# Patient Record
Sex: FEMALE | Race: WHITE | Employment: OTHER | URBAN - METROPOLITAN AREA
[De-identification: names, ages, dates, MRNs, and addresses within clinical notes are randomized per-mention and may not be internally consistent; named-entity substitution may affect disease eponyms.]

---

## 2018-02-02 ENCOUNTER — APPOINTMENT (OUTPATIENT)
Dept: CT IMAGING | Age: 48
End: 2018-02-02
Attending: PHYSICIAN ASSISTANT
Payer: COMMERCIAL

## 2018-02-02 ENCOUNTER — HOSPITAL ENCOUNTER (EMERGENCY)
Age: 48
Discharge: HOME | End: 2018-02-02
Attending: EMERGENCY MEDICINE
Payer: COMMERCIAL

## 2018-02-02 ENCOUNTER — APPOINTMENT (OUTPATIENT)
Dept: GENERAL RADIOLOGY | Age: 48
End: 2018-02-02
Attending: PHYSICIAN ASSISTANT
Payer: COMMERCIAL

## 2018-02-02 VITALS
HEART RATE: 70 BPM | DIASTOLIC BLOOD PRESSURE: 66 MMHG | RESPIRATION RATE: 18 BRPM | OXYGEN SATURATION: 100 % | HEIGHT: 59 IN | SYSTOLIC BLOOD PRESSURE: 113 MMHG | BODY MASS INDEX: 26.81 KG/M2 | TEMPERATURE: 97.7 F | WEIGHT: 133 LBS
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
## 2018-02-02 DIAGNOSIS — S06.0X0D CONCUSSION WITHOUT LOSS OF CONSCIOUSNESS, SUBSEQUENT ENCOUNTER: ICD-10-CM

## 2018-02-02 DIAGNOSIS — S80.01XA CONTUSION OF RIGHT KNEE, INITIAL ENCOUNTER: ICD-10-CM

## 2018-02-02 DIAGNOSIS — S70.01XA CONTUSION OF RIGHT HIP, INITIAL ENCOUNTER: ICD-10-CM

## 2018-02-02 PROCEDURE — 70450 CT HEAD/BRAIN W/O DYE: CPT

## 2018-02-02 PROCEDURE — 73564 X-RAY EXAM KNEE 4 OR MORE: CPT

## 2018-02-02 PROCEDURE — 73502 X-RAY EXAM HIP UNI 2-3 VIEWS: CPT

## 2018-02-02 PROCEDURE — 99284 EMERGENCY DEPT VISIT MOD MDM: CPT

## 2018-02-02 RX ORDER — TRIAZOLAM 0.25 MG/1
0.12 TABLET ORAL
COMMUNITY

## 2018-02-02 RX ORDER — PANTOPRAZOLE SODIUM 40 MG/1
40 TABLET, DELAYED RELEASE ORAL 2 TIMES DAILY
COMMUNITY

## 2018-02-02 RX ORDER — AMITRIPTYLINE HYDROCHLORIDE 75 MG/1
50 TABLET, FILM COATED ORAL
COMMUNITY
End: 2018-07-26 | Stop reason: ALTCHOICE

## 2018-02-02 RX ORDER — PRAVASTATIN SODIUM 20 MG/1
20 TABLET ORAL
COMMUNITY

## 2018-02-02 RX ORDER — HYDROGEN PEROXIDE 3 %
20 SOLUTION, NON-ORAL MISCELLANEOUS DAILY
COMMUNITY
End: 2018-08-21

## 2018-02-02 RX ORDER — SUMATRIPTAN 100 MG/1
100 TABLET, FILM COATED ORAL
COMMUNITY

## 2018-02-02 RX ORDER — TOPIRAMATE 25 MG/1
100 TABLET ORAL 2 TIMES DAILY
COMMUNITY

## 2018-02-02 RX ORDER — PROPRANOLOL HYDROCHLORIDE 40 MG/1
40 TABLET ORAL 2 TIMES DAILY
COMMUNITY
End: 2018-08-21

## 2018-02-02 NOTE — ED PROVIDER NOTES
EMERGENCY DEPARTMENT HISTORY AND PHYSICAL EXAM    Date: 2/2/2018  Patient Name: Miky Jacques    History of Presenting Illness     Chief Complaint   Patient presents with   Danuta Rung Fall    Headache         History Provided By: Patient    Chief Complaint: head injury  Duration: 3 days ago  Associated Symptoms: diffuse headache, dizziness (increased with movement), nausea, vomiting, and difficulty concentrating    Additional History (Context):   6:21 PM    Miky Jacques is a 49 y.o. female with pertinent PMHx of hysterectomy, multiminicore neuropathy, migraines, fibromyalgia, and chronic pain presenting ambulatory to the ED c/o head injury 3 days ago. Pt states that she fell 3 days ago. She fell out of bed and hit her head on the floor, but did not remember it. Pt states that she crawled to the bathroom and when she tried to stand with the support of the toilet, she fell again. She attempted to stand again and fell another time, with a total of 3 falls. She believes that she experienced LOC and states \"I don't know how I got back to the bed\". Pt notes associated symptoms of diffuse headache, dizziness (increased with movement), nausea, vomiting, and difficulty concentrating. Pt notes a few areas of bruising from the fall, with significant pain to the right knee and right hip. Pt's  notes concern as Ashley Most never spells things wrong, but she has been spelling things wrong in texts over the last few days\". Pt was told to go to [facility] two days ago by her Neurologist. Pt states \"they didn't do a thing and we waited for 5 hours\". She also called her PCP today, who advised that she go to the ED today. Pt specifically denies any blood thinner use. Pt specifically denies any fever/chills. Social Hx: - tobacco use, - alcohol use, - illicit drug use    There are no other complaints, changes, or physical findings at this time.      Current Outpatient Prescriptions   Medication Sig Dispense Refill    amitriptyline (ELAVIL) 75 mg tablet Take 50 mg by mouth nightly. Indications: MIGRAINE PREVENTION      pantoprazole (PROTONIX) 40 mg tablet Take 40 mg by mouth daily. Indications: gastroesophageal reflux disease      pravastatin (PRAVACHOL) 20 mg tablet Take 20 mg by mouth nightly. Indications: hypercholesterolemia      propranolol (INDERAL) 40 mg tablet Take 40 mg by mouth two (2) times a day. Indications: MIGRAINE PREVENTION      topiramate (TOPAMAX) 25 mg tablet Take 75 mg by mouth two (2) times a day. Indications: MIGRAINE PREVENTION      esomeprazole (NEXIUM) 20 mg capsule Take 20 mg by mouth daily.  SUMAtriptan (IMITREX) 100 mg tablet Take 100 mg by mouth once as needed for Migraine.  triazolam (HALCION) 0.25 mg tablet Take 0.25 mg by mouth nightly as needed. Past History     Past Medical History:  Past Medical History:   Diagnosis Date    Arthritis     Gastrointestinal disorder     Ill-defined condition     firbomyalgia    Ill-defined condition     chronic pain    Neurological disorder     migraines       Past Surgical History:  Past Surgical History:   Procedure Laterality Date    HX APPENDECTOMY      HX CHOLECYSTECTOMY      HX GI      HX GYN      hysterectomy    HX ORTHOPAEDIC         Family History:  History reviewed. No pertinent family history. Social History:  Social History   Substance Use Topics    Smoking status: Never Smoker    Smokeless tobacco: Never Used    Alcohol use No       Allergies: Allergies   Allergen Reactions    Compazine [Prochlorperazine] Seizures    Animi-3 [Om [id number]] Swelling    Hydrocodone Itching and Nausea and Vomiting    Lorabid [Loracarbef] Hives and Itching    Sulfa (Sulfonamide Antibiotics) Nausea Only    Thorazine [Chlorpromazine] Palpitations         Review of Systems   Review of Systems   Constitutional: Negative for chills and fever. Gastrointestinal: Positive for nausea and vomiting.    Musculoskeletal: Positive for arthralgias (right knee pain and right hip pain). Neurological: Positive for dizziness and headaches. Positive for LOC  Positive for difficulty concentrating  Positive for head injury   All other systems reviewed and are negative. Physical Exam     Vitals:    02/02/18 1736   BP: 113/66   Pulse: 70   Resp: 18   Temp: 97.7 °F (36.5 °C)   SpO2: 100%   Weight: 60.3 kg (133 lb)   Height: 4' 11\" (1.499 m)     Physical Exam   Constitutional: She is oriented to person, place, and time. She appears well-developed and well-nourished. Alert,lying on stretcher, oriented x4    HENT:   Head: Normocephalic and atraumatic. Head is without raccoon's eyes and without Godinez's sign. Right Ear: Tympanic membrane, external ear and ear canal normal. No mastoid tenderness. Tympanic membrane is not perforated, not erythematous, not retracted and not bulging. No hemotympanum. Left Ear: Tympanic membrane, external ear and ear canal normal. No mastoid tenderness. Tympanic membrane is not perforated, not erythematous, not retracted and not bulging. No hemotympanum. Nose: Nose normal.   Mouth/Throat: Uvula is midline, oropharynx is clear and moist and mucous membranes are normal. No trismus in the jaw. No uvula swelling. No oropharyngeal exudate, posterior oropharyngeal edema, posterior oropharyngeal erythema or tonsillar abscesses. Eyes: EOM are normal. Pupils are equal, round, and reactive to light. Neck: Normal range of motion. Neck supple. No spinous process tenderness and no muscular tenderness present. No rigidity. Normal range of motion present. No Brudzinski's sign and no Kernig's sign noted. No meningismus   No lymphadenopathy    Cardiovascular: Normal rate, regular rhythm, normal heart sounds and intact distal pulses. No murmur heard. Pulmonary/Chest: Effort normal and breath sounds normal. No respiratory distress. She has no wheezes. She has no rales. Abdominal: Soft.  Bowel sounds are normal. She exhibits no distension. There is no tenderness. There is no rebound and no guarding. Musculoskeletal: Normal range of motion. Right knee: She exhibits ecchymosis. She exhibits normal range of motion, no swelling, no effusion, no deformity and normal patellar mobility. Tenderness found. No patellar tendon tenderness noted. Legs:  LLE chronically weaker than right, baseline for pt    Neurological: She is alert and oriented to person, place, and time. She has normal strength. She displays no atrophy and no tremor. No cranial nerve deficit or sensory deficit. She exhibits normal muscle tone. Coordination and gait normal. GCS eye subscore is 4. GCS verbal subscore is 5. GCS motor subscore is 6. No neuro deficit   Normal finger nose finger  N/V intact distally   Strength 5/5 and equal in all extremities   No slurred speech   No facial droop    Skin: Skin is warm and dry. Psychiatric: She has a normal mood and affect. Judgment normal.   Nursing note and vitals reviewed. Diagnostic Study Results     Labs -   No results found for this or any previous visit (from the past 12 hour(s)). Radiologic Studies -   XR HIP RT W OR WO PELV 2-3 VWS   Final Result      IMPRESSION  IMPRESSION: No definite fracture. However, the frontal view demonstrates  questionable subtle impaction of the femoral neck. Given the degree of  osteopenia, if the patient is having ongoing pain with persistent clinical  suspicion for occult fracture, recommend follow-up CT. CT HEAD WO CONT   Final Result      XR KNEE RT MIN 4 V    (Results Pending)     CT Results  (Last 48 hours)               02/02/18 1935  CT HEAD WO CONT Final result    Impression:  IMPRESSION:       1. No acute intracranial hemorrhage, mass effect, midline shift, or herniation. No definite CT evidence of acute cortical infarct is seen. Please note that   noncontrast head CT may be normal in early acute infarct.            Narrative:  EXAM: CT head INDICATION: Closed head trauma. COMPARISON: None. TECHNIQUE: Axial CT imaging of the head was performed without intravenous   contrast. Multiplanar reconstructions were performed. One or more dose   reduction techniques were used on this CT: automated exposure control,   adjustment of the mAs and/or kVp according to patient's size, and iterative   reconstruction techniques. The specific techniques utilized on this CT exam have   been documented in the patient's electronic medical record.       _______________       FINDINGS:       VENTRICLES/EXTRA-AXIAL SPACES: The ventricles and sulci are normal in their size   and configuration. BRAIN PARENCHYMA: There is no evidence of acute intracranial hemorrhage, mass   effect, midline shift, or herniation. No definite CT evidence of acute cortical   infarct is seen. The gray-white matter differentiation is within normal limits. OSSEOUS STRUCTURES: No fracture is seen. PARANASAL SINUSES/MASTOIDS: Visualized paranasal sinuses and mastoid air cells   are clear. ORBITS: The visualized orbits are unremarkable. OTHER: None.         _______________             9:09 PM  RADIOLOGY FINDINGS  Per Radiologist reading the right hip XR, states that if the pt is able to weight bear, then it is not an acute fx. Written by Raj Fong [address] [place] [address], ED Scribe, as dictated by Phi Cheng PA-C.     9:16 PM  RADIOLOGY FINDINGS  Right knee X-ray shows no acute process. Pending review by Radiologist  Recorded by Kd Jacobo, ED Scribe, as dictated by Phi Cheng PA-C. Medical Decision Making   I am the first provider for this patient. I reviewed the vital signs, available nursing notes, past medical history, past surgical history, family history and social history. Vital Signs-Reviewed the patient's vital signs.     Pulse Oximetry Analysis - 100% on RA     Records Reviewed: Nursing Notes    Provider Notes (Medical Decision Making):   Skull fx, intracranial bleed, concussion, hip fx, knee fx vs contusion     PROCEDURES:  Procedures    MEDICATIONS GIVEN IN THE ED:  Medications - No data to display     ED COURSE:   6:21 PM   Initial assessment performed. PROGRESS NOTE:  9:17 PM  Pt and/or family have been updated on their results. Pt and/or pt's family are aware of the plan of care and are in agreement. Written by Michael Bradley, ED Scribe, as dictated by Omid Vuong PA-C. Discussion  Recent fall with head injury, no neuro deficits, chronic/unchanged left leg weakness. Normal head CT. Pt was able to weight bear. She states that she has had a right hip fx in the past, and states that her current pain is not the same. Do not suspect findings seen on XR represent acute hip fracture. Diagnosis and Disposition       DISCHARGE NOTE:  9:18 PM  The patient is ready for discharge. The patient's signs, symptoms, diagnosis, and discharge instructions have been discussed and the patient and/or family has conveyed their understanding. The patient and/or family is to follow up as recommended or return to the ER should their symptoms worsen. Plan has been discussed and the patient and/or family is in agreement. Written by Michael Bradley, ED Scribe, as dictated by Omid Vuong PA-C.     CLINICAL IMPRESSION:  1. Fall, initial encounter    2. Concussion without loss of consciousness, subsequent encounter    3. Contusion of right hip, initial encounter    4. Contusion of right knee, initial encounter          PLAN:  1. D/C Home  2. Discharge Medication List as of 2/2/2018  9:18 PM        3.    Follow-up Information     Follow up With Details Comments [id number] [facility] [address] Jeff Sharma MD Schedule an appointment as soon as possible for a visit for primary care follow up, as needed [id number] [facility] [address] EMERGENCY DEPT  As needed, If symptoms worsen [address] [id number] [phone] _______________________________    Attestations: This note is prepared by Carmina Conn, acting as Scribe for Beth Tapia PA-C. Beth Tapia PA-C:  The scribe's documentation has been prepared under my direction and personally reviewed by me in its entirety.   I confirm that the note above accurately reflects all work, treatment, procedures, and medical decision making performed by me.  _______________________________

## 2018-02-02 NOTE — ED TRIAGE NOTES
Patient with complaints of fall that occurred on Tuesday. Patient states she hit her head and she has a headache and feels tired. Patient was seen at [facility] on Wednesday and discharged home. Patient states she followed up with her neurologist and she still doesn't feel well.

## 2018-02-03 NOTE — ED NOTES
Pt lying on left side conversing w/  at the bedside. Pt in NAD. Pt updated on status of imaging studies being read. Warm blankets provided.

## 2018-02-03 NOTE — DISCHARGE INSTRUCTIONS
Bruises: Care Instructions  Your Care Instructions    Bruises occur when small blood vessels under the skin tear or rupture, most often from a twist, bump, or fall. Blood leaks into tissues under the skin and causes a black-and-blue spot that often turns colors, including purplish black, reddish blue, or yellowish green, as the bruise heals. Bruises hurt, but most are not serious and will go away on their own within 2 to 4 weeks. Sometimes, gravity causes them to spread down the body. A leg bruise usually will take longer to heal than a bruise on the face or arms. Follow-up care is a key part of your treatment and safety. Be sure to make and go to all appointments, and call your doctor if you are having problems. It's also a good idea to know your test results and keep a list of the medicines you take. How can you care for yourself at home? · Take pain medicines exactly as directed. ¨ If the doctor gave you a prescription medicine for pain, take it as prescribed. ¨ If you are not taking a prescription pain medicine, ask your doctor if you can take an over-the-counter medicine. · Put ice or a cold pack on the area for 10 to 20 minutes at a time. Put a thin cloth between the ice and your skin. · If you can, prop up the bruised area on pillows as much as possible for the next few days. Try to keep the bruise above the level of your heart. When should you call for help? Call your doctor now or seek immediate medical care if:  ? · You have signs of infection, such as:  ¨ Increased pain, swelling, warmth, or redness. ¨ Red streaks leading from the bruise. ¨ Pus draining from the bruise. ¨ A fever. ? · You have a bruise on your leg and signs of a blood clot, such as:  ¨ Increasing redness and swelling along with warmth, tenderness, and pain in the bruised area. ¨ Pain in your calf, back of the knee, thigh, or groin. ¨ Redness and swelling in your leg or groin. ? · Your pain gets worse. ? Watch closely for changes in your health, and be sure to contact your doctor if:  ? · You do not get better as expected. Where can you learn more? Go to http://asiya-bre.info/. Enter [phone] in the search box to learn more about \"Bruises: Care Instructions. \"  Current as of: March 20, 2017  Content Version: 11.4  © 2075-3699 [employer]. Care instructions adapted under license by [employer] (which disclaims liability or warranty for this information). If you have questions about a medical condition or this instruction, always ask your healthcare professional. [place] [address] any warranty or liability for your use of this information. Concussion: Care Instructions  Your Care Instructions    A concussion is a kind of injury to the brain. It happens when the head receives a hard blow. The impact can jar or shake the brain against the skull. This interrupts the brain's normal activities. Although you may have cuts or bruises on your head or face, you may have no other visible signs of a brain injury. In most cases, damage to the brain from a concussion can't be seen in tests such as a CT or MRI scan. For a few weeks, you may have low energy, dizziness, trouble sleeping, a headache, ringing in your ears, or nausea. You may also feel anxious, grumpy, or depressed. You may have problems with memory and concentration. These symptoms are common after a concussion. They should slowly improve over time. Sometimes this takes weeks or even months. Someone who lives with you should know how to care for you. Please share this and all information with a caregiver who will be available to help if needed. Follow-up care is a key part of your treatment and safety. Be sure to make and go to all appointments, and call your doctor if you are having problems. It's also a good idea to know your test results and keep a list of the medicines you take.   How can you care for yourself at home? Pain control  · Put ice or a cold pack on the part of your head that hurts for 10 to 20 minutes at a time. Put a thin cloth between the ice and your skin. · Be safe with medicines. Read and follow all instructions on the label. ¨ If the doctor gave you a prescription medicine for pain, take it as prescribed. ¨ If you are not taking a prescription pain medicine, ask your doctor if you can take an over-the-counter medicine. Recovery  · Follow your doctor's instructions. He or she will tell you if you need someone to watch you closely for the next 24 hours or longer. · Rest is the best way to recover from a concussion. You need to rest your body and your brain:  ¨ Get plenty of sleep at night. And take rest breaks during the day. ¨ Avoid activities that take a lot of physical or mental work. This includes housework, exercise, schoolwork, video games, text messaging, and using the computer. ¨ You may need to change your school or work schedule while you recover. ¨ Return to your normal activities slowly. Do not try to do too much at once. · Do not drink alcohol or use illegal drugs. Alcohol and illegal drugs can slow your recovery. And they can increase your risk of a second brain injury. · Avoid activities that could lead to another concussion. Follow your doctor's instructions for a gradual return to activity and sports. · Ask your doctor when it's okay for you to drive a car, ride a bike, or operate machinery. How should you return to activity? Your return to sports or activity should be gradual. It should only begin when all symptoms of a concussion are gone, both while at rest and during exercise or exertion. Doctors and concussion specialists suggest steps to follow for returning to sports after a concussion. Use these steps as a guide. You should slowly progress through the following levels of activity:  1. No activity.  This means complete physical and mental rest.  2. Light aerobic activity. This can include walking, swimming, or other exercise at less than 70% of maximum heart rate. No resistance training is included in this step. 3. Sport-specific exercise. This includes running drills or skating drills (depending on the sport), but no head impact. 4. Noncontact training drills. This includes more complex training drills such as passing. The athlete may also begin light resistance training. 5. Full-contact practice. The athlete can participate in normal training. 6. Return to normal game play. This is the final step and allows the athlete to join in normal game play. Watch and keep track of your progress. It should take at least 6 days for you to go from light activity to normal game play. Make sure that you can stay at each new level of activity for at least 24 hours without symptoms, or as long as your doctor says, before doing more. If one or more symptoms come back, return to a lower level of activity for at least 24 hours. Don't move on until all symptoms are gone. When should you call for help? Call 911 anytime you think you may need emergency care. For example, call if:  ? · You have a seizure. ? · You passed out (lost consciousness). ? · You are confused or can't stay awake. ?Call your doctor now or seek immediate medical care if:  ? · You have new or worse vomiting. ? · You feel less alert. ? · You have new weakness or numbness in any part of your body. ? Watch closely for changes in your health, and be sure to contact your doctor if:  ? · You do not get better as expected. ? · You have new symptoms, such as headaches, trouble concentrating, or changes in mood. Where can you learn more? Go to http://asiya-bre.info/. Enter X704 in the search box to learn more about \"Concussion: Care Instructions. \"  Current as of: October 14, 2016  Content Version: 11.4  © 3233-3592 [employer].  Care instructions adapted under license by [address] (which disclaims liability or warranty for this information). If you have questions about a medical condition or this instruction, always ask your healthcare professional. [address] any warranty or liability for your use of this information. Preventing Falls: Care Instructions  Your Care Instructions    Getting around your home safely can be a challenge if you have injuries or health problems that make it easy for you to fall. Loose rugs and furniture in walkways are among the dangers for many older people who have problems walking or who have poor eyesight. People who have conditions such as arthritis, osteoporosis, or dementia also have to be careful not to fall. You can make your home safer with a few simple measures. Follow-up care is a key part of your treatment and safety. Be sure to make and go to all appointments, and call your doctor if you are having problems. It's also a good idea to know your test results and keep a list of the medicines you take. How can you care for yourself at home? Taking care of yourself  · You may get dizzy if you do not drink enough water. To prevent dehydration, drink plenty of fluids, enough so that your urine is light yellow or clear like water. Choose water and other caffeine-free clear liquids. If you have kidney, heart, or liver disease and have to limit fluids, talk with your doctor before you increase the amount of fluids you drink. · Exercise regularly to improve your strength, muscle tone, and balance. Walk if you can. Swimming may be a good choice if you cannot walk easily. · Have your vision and hearing checked each year or any time you notice a change. If you have trouble seeing and hearing, you might not be able to avoid objects and could lose your balance. · Know the side effects of the medicines you take. Ask your doctor or pharmacist whether the medicines you take can affect your balance. Sleeping pills or sedatives can affect your balance. · Limit the amount of alcohol you drink. Alcohol can impair your balance and other senses. · Ask your doctor whether calluses or corns on your feet need to be removed. If you wear loose-fitting shoes because of calluses or corns, you can lose your balance and fall. · Talk to your doctor if you have numbness in your feet. Preventing falls at home  · Remove raised doorway thresholds, throw rugs, and clutter. Repair loose carpet or raised areas in the floor. · Move furniture and electrical cords to keep them out of walking paths. · Use nonskid floor wax, and wipe up spills right away, especially on ceramic tile floors. · If you use a walker or cane, put rubber tips on it. If you use crutches, clean the bottoms of them regularly with an abrasive pad, such as steel wool. · Keep your house well lit, especially [facility], and outside walkways. Use night-lights in areas such as hallways and bathrooms. Add extra light switches or use remote switches (such as switches that go on or off when you clap your hands) to make it easier to turn lights on if you have to get up during the night. · Install sturdy handrails on stairways. · Move items in your cabinets so that the things you use a lot are on the lower shelves (about waist level). · Keep a cordless phone and a flashlight with new batteries by your bed. If possible, put a phone in each of the main rooms of your house, or carry a cell phone in case you fall and cannot reach a phone. Or, you can wear a device around your neck or wrist. You push a button that sends a signal for help. · Wear low-heeled shoes that fit well and give your feet good support. Use footwear with nonskid soles. Check the heels and soles of your shoes for wear. Repair or replace worn heels or soles. · Do not wear socks without shoes on wood floors. · Walk on the grass when the sidewalks are slippery.  If you live in an area that gets snow and ice in the winter, sprinkle salt on slippery steps and sidewalks. Preventing falls in the bath  · Install grab bars and nonskid mats inside and outside your shower or tub and near the toilet and sinks. · Use shower chairs and bath benches. · Use a hand-held shower head that will allow you to sit while showering. · Get into a tub or shower by putting the weaker leg in first. Get out of a tub or shower with your strong side first.  · Repair loose toilet seats and consider installing a raised toilet seat to make getting on and off the toilet easier. · Keep your bathroom door unlocked while you are in the shower. Where can you learn more? Go to http://asiya-bre.info/. Enter [phone] [id number] [phone] in the search box to learn more about \"Preventing Falls: Care Instructions. \"  Current as of: May 12, 2017  Content Version: 11.4  © 8100-4535 [employer], [facility]. Care instructions adapted under license by [employer] (which disclaims liability or warranty for this information). If you have questions about a medical condition or this instruction, always ask your healthcare professional. [place] [address] any warranty or liability for your use of this information.

## 2018-02-05 ENCOUNTER — HOSPITAL ENCOUNTER (EMERGENCY)
Age: 48
Discharge: HOME | End: 2018-02-05
Attending: INTERNAL MEDICINE | Admitting: EMERGENCY MEDICINE
Payer: COMMERCIAL

## 2018-02-05 ENCOUNTER — APPOINTMENT (OUTPATIENT)
Dept: GENERAL RADIOLOGY | Age: 48
End: 2018-02-05
Attending: PHYSICIAN ASSISTANT
Payer: COMMERCIAL

## 2018-02-05 ENCOUNTER — APPOINTMENT (OUTPATIENT)
Dept: CT IMAGING | Age: 48
End: 2018-02-05
Attending: PHYSICIAN ASSISTANT
Payer: COMMERCIAL

## 2018-02-05 VITALS
TEMPERATURE: 98.1 F | RESPIRATION RATE: 15 BRPM | OXYGEN SATURATION: 100 % | HEIGHT: 59 IN | WEIGHT: 133 LBS | HEART RATE: 69 BPM | BODY MASS INDEX: 26.81 KG/M2 | DIASTOLIC BLOOD PRESSURE: 76 MMHG | SYSTOLIC BLOOD PRESSURE: 121 MMHG

## 2018-02-05 DIAGNOSIS — M25.552 PAIN OF BOTH HIP JOINTS: ICD-10-CM

## 2018-02-05 DIAGNOSIS — R07.89 MUSCULOSKELETAL CHEST PAIN: Primary | ICD-10-CM

## 2018-02-05 DIAGNOSIS — M25.551 PAIN OF BOTH HIP JOINTS: ICD-10-CM

## 2018-02-05 DIAGNOSIS — M54.6 ACUTE LEFT-SIDED THORACIC BACK PAIN: ICD-10-CM

## 2018-02-05 LAB
ALBUMIN SERPL-MCNC: 3.7 G/DL (ref 3.4–5)
ALBUMIN/GLOB SERPL: 1 {RATIO} (ref 0.8–1.7)
ALP SERPL-CCNC: 87 U/L (ref 45–117)
ALT SERPL-CCNC: 21 U/L (ref 13–56)
ANION GAP SERPL CALC-SCNC: 12 MMOL/L (ref 3–18)
AST SERPL-CCNC: 15 U/L (ref 15–37)
BASOPHILS # BLD: 0 K/UL (ref 0–0.06)
BASOPHILS NFR BLD: 0 % (ref 0–2)
BILIRUB SERPL-MCNC: 0.2 MG/DL (ref 0.2–1)
BUN SERPL-MCNC: 15 MG/DL (ref 7–18)
BUN/CREAT SERPL: 20 (ref 12–20)
CALCIUM SERPL-MCNC: 8.9 MG/DL (ref 8.5–10.1)
CHLORIDE SERPL-SCNC: 104 MMOL/L (ref 100–108)
CK MB CFR SERPL CALC: 1.4 % (ref 0–4)
CK MB SERPL-MCNC: 1.9 NG/ML (ref 5–25)
CK SERPL-CCNC: 136 U/L (ref 26–192)
CO2 SERPL-SCNC: 23 MMOL/L (ref 21–32)
CREAT SERPL-MCNC: 0.75 MG/DL (ref 0.6–1.3)
DIFFERENTIAL METHOD BLD: ABNORMAL
EOSINOPHIL # BLD: 0.1 K/UL (ref 0–0.4)
EOSINOPHIL NFR BLD: 2 % (ref 0–5)
ERYTHROCYTE [DISTWIDTH] IN BLOOD BY AUTOMATED COUNT: 14 % (ref 11.6–14.5)
GLOBULIN SER CALC-MCNC: 3.8 G/DL (ref 2–4)
GLUCOSE SERPL-MCNC: 91 MG/DL (ref 74–99)
HCT VFR BLD AUTO: 46.2 % (ref 35–45)
HGB BLD-MCNC: 14.6 G/DL (ref 12–16)
LYMPHOCYTES # BLD: 1.5 K/UL (ref 0.9–3.6)
LYMPHOCYTES NFR BLD: 29 % (ref 21–52)
MCH RBC QN AUTO: 35.1 PG (ref 24–34)
MCHC RBC AUTO-ENTMCNC: 31.6 G/DL (ref 31–37)
MCV RBC AUTO: 111.1 FL (ref 74–97)
MONOCYTES # BLD: 0.5 K/UL (ref 0.05–1.2)
MONOCYTES NFR BLD: 9 % (ref 3–10)
NEUTS SEG # BLD: 3.1 K/UL (ref 1.8–8)
NEUTS SEG NFR BLD: 60 % (ref 40–73)
PLATELET # BLD AUTO: 196 K/UL (ref 135–420)
PMV BLD AUTO: 8.6 FL (ref 9.2–11.8)
POTASSIUM SERPL-SCNC: 4.5 MMOL/L (ref 3.5–5.5)
PROT SERPL-MCNC: 7.5 G/DL (ref 6.4–8.2)
RBC # BLD AUTO: 4.16 M/UL (ref 4.2–5.3)
SODIUM SERPL-SCNC: 139 MMOL/L (ref 136–145)
TROPONIN I SERPL-MCNC: <0.02 NG/ML (ref 0–0.06)
WBC # BLD AUTO: 5.2 K/UL (ref 4.6–13.2)

## 2018-02-05 PROCEDURE — 99285 EMERGENCY DEPT VISIT HI MDM: CPT

## 2018-02-05 PROCEDURE — 85025 COMPLETE CBC W/AUTO DIFF WBC: CPT | Performed by: INTERNAL MEDICINE

## 2018-02-05 PROCEDURE — 82550 ASSAY OF CK (CPK): CPT | Performed by: INTERNAL MEDICINE

## 2018-02-05 PROCEDURE — 94762 N-INVAS EAR/PLS OXIMTRY CONT: CPT

## 2018-02-05 PROCEDURE — 74011250637 HC RX REV CODE- 250/637: Performed by: PHYSICIAN ASSISTANT

## 2018-02-05 PROCEDURE — 71046 X-RAY EXAM CHEST 2 VIEWS: CPT

## 2018-02-05 PROCEDURE — 73700 CT LOWER EXTREMITY W/O DYE: CPT

## 2018-02-05 PROCEDURE — 72070 X-RAY EXAM THORAC SPINE 2VWS: CPT

## 2018-02-05 PROCEDURE — 80053 COMPREHEN METABOLIC PANEL: CPT | Performed by: INTERNAL MEDICINE

## 2018-02-05 PROCEDURE — 93005 ELECTROCARDIOGRAM TRACING: CPT

## 2018-02-05 RX ORDER — ACETAMINOPHEN AND CODEINE PHOSPHATE 300; 30 MG/1; MG/1
1 TABLET ORAL
Qty: 6 TAB | Refills: 0 | Status: SHIPPED | OUTPATIENT
Start: 2018-02-05 | End: 2018-08-21

## 2018-02-05 RX ORDER — KETOROLAC TROMETHAMINE 30 MG/ML
30 INJECTION, SOLUTION INTRAMUSCULAR; INTRAVENOUS
Status: DISCONTINUED | OUTPATIENT
Start: 2018-02-05 | End: 2018-02-05 | Stop reason: HOSPADM

## 2018-02-05 RX ORDER — ACETAMINOPHEN AND CODEINE PHOSPHATE 300; 30 MG/1; MG/1
1 TABLET ORAL
Status: COMPLETED | OUTPATIENT
Start: 2018-02-05 | End: 2018-02-05

## 2018-02-05 RX ADMIN — ACETAMINOPHEN AND CODEINE PHOSPHATE 1 TABLET: 300; 30 TABLET ORAL at 17:48

## 2018-02-05 NOTE — ED PROVIDER NOTES
[address]  EMERGENCY DEPARTMENT HISTORY AND PHYSICAL EXAM    Date: 2/5/2018  Patient Name: Satish Menendez  YOB: 1970  Medical Record Number: [id number]    History of Presenting Illness     Chief Complaint   Patient presents with    Chest Pain       History Provided By: Patient    Chief Complaint: Chest pain  Duration: 1 Days  Timing:  Constant  Location: Substernal left side chest radiating up to midsternum  Quality: Sharp  Severity: 10 out of 10  Modifying Factors: Worse with deep breaths  Associated Symptoms: Thoracic midline back pain & new associated sxs of SOB onset yesterday, left sided back pain onset 2 days ago, left hip pain onset this morning, and tingling (\"pin prick sensation\") from waist down BLE    Additional History (Context):   2:05 PM   Satish Menendez is a 49 y.o. female with PMHX arthritis, who presents to the emergency department C/O constant substernal left sided chest pain radiating up to midsternum onset yesterday s/p fall 6 days ago. Associated symptoms include thoracic midline back pain. Pt was seen by [facility] s/p fall initially 6 days ago, then seen again by [facility] 3 days ago for same. Previous workup from [facility] includes right hip XR, CT head, and right knee; CT head and right knee XR were negative, but states she had abnormal right hip XR. Notes new associated sxs of SOB onset yesterday, left sided back pain onset 2 days ago, left hip pain onset this morning s/p BM, and tingling (\"pin prick sensation\") from waist and down BLE. Reports she feels SOB only because when she takes a deep breath, she feels pain in her chest. States she was unable to schedule appointment with her PCP, so pt came to ED. No hx of stress test. Pt denies cough, fever, chills, fx DM, hx HTN, and any other Sx or complaints.       Shx: -tobacco use, -EtOH use, -illicit drug use    PCP: Roddy Alfonso MD    Current Facility-Administered Medications   Medication Dose Route Frequency Provider Last Rate Last Dose    ketorolac (TORADOL) injection 30 mg  30 mg IntraVENous NOW [place], [place]        acetaminophen-codeine (TYLENOL #3) per tablet 1 Tab  1 Tab Oral NOW [place]         Current Outpatient Prescriptions   Medication Sig Dispense Refill    acetaminophen-codeine (TYLENOL-CODEINE #3) 300-30 mg per tablet Take 1 Tab by mouth every six (6) hours as needed for Pain. Max Daily Amount: 4 Tabs. 6 Tab 0    amitriptyline (ELAVIL) 75 mg tablet Take 50 mg by mouth nightly. Indications: MIGRAINE PREVENTION      pantoprazole (PROTONIX) 40 mg tablet Take 40 mg by mouth daily. Indications: gastroesophageal reflux disease      pravastatin (PRAVACHOL) 20 mg tablet Take 20 mg by mouth nightly. Indications: hypercholesterolemia      propranolol (INDERAL) 40 mg tablet Take 40 mg by mouth two (2) times a day. Indications: MIGRAINE PREVENTION      topiramate (TOPAMAX) 25 mg tablet Take 75 mg by mouth two (2) times a day. Indications: MIGRAINE PREVENTION      esomeprazole (NEXIUM) 20 mg capsule Take 20 mg by mouth daily.  SUMAtriptan (IMITREX) 100 mg tablet Take 100 mg by mouth once as needed for Migraine.  triazolam (HALCION) 0.25 mg tablet Take 0.25 mg by mouth nightly as needed. Past History     Past Medical History:  Past Medical History:   Diagnosis Date    Arthritis     At risk for falls     Concussion     Contusion     Fall     Gastrointestinal disorder     Ill-defined condition     firbomyalgia    Ill-defined condition     chronic pain    Neurological disorder     migraines       Past Surgical History:  Past Surgical History:   Procedure Laterality Date    HX APPENDECTOMY      HX CHOLECYSTECTOMY      HX GI      HX GYN      hysterectomy    HX ORTHOPAEDIC         Family History:  No family history on file.     Social History:  Social History   Substance Use Topics    Smoking status: Never Smoker    Smokeless tobacco: Never Used    Alcohol use No       Allergies: Allergies   Allergen Reactions    Compazine [Prochlorperazine] Seizures    Animi-3 [Om [id number]] Swelling    Hydrocodone Itching and Nausea and Vomiting    Lorabid [Loracarbef] Hives and Itching    Sulfa (Sulfonamide Antibiotics) Nausea Only    Thorazine [Chlorpromazine] Palpitations         Review of Systems     Review of Systems   Constitutional: Negative for chills and fever. Respiratory: Positive for shortness of breath. Negative for cough. Cardiovascular: Positive for chest pain. Musculoskeletal: Positive for back pain. Neurological:        (+) tingling   All other systems reviewed and are negative. Physical Exam     Vitals:    02/05/18 1340 02/05/18 1400 02/05/18 1545   BP: 121/70 101/70 121/76   Pulse: 74 71 69   Resp: 16 18 15   Temp: 98.1 °F (36.7 °C)     SpO2: 100% 99% 100%   Weight: 60.3 kg (133 lb)     Height: 4' 11\" (1.499 m)       Physical Exam   Constitutional: She is oriented to person, place, and time. She appears well-developed and well-nourished. No distress. Neck: Neck supple. No tracheal deviation present. Cardiovascular: Normal rate, regular rhythm and normal heart sounds. Exam reveals no gallop and no friction rub. No murmur heard. Pulmonary/Chest: Effort normal and breath sounds normal. No respiratory distress. She has no wheezes. She has no rales. She exhibits tenderness. + TTP over the anterior chest wall and under the left breast, no crepitus, subcutaneous emphysema, ecchymosis or step off. Musculoskeletal:   Non tender to midline palpation of the cervical, thoracic, and lumbar spine. No step off or deformity. Mild TTP over the left side of the thoracic spine. FROM of BUE and BLE against resistance in flexion and extension with 5/5 strength. Non tender to bilateral shoulders/elbows/hands/hips/knees/ankles.   Pulses intact and equal.  Patient reports tingling in the left hip, sensation appear grossly intact on exam.      Lymphadenopathy:     She has no cervical adenopathy. Neurological: She is alert and oriented to person, place, and time. No cranial nerve deficit. Skin: Skin is warm and dry. No rash noted. She is not diaphoretic. No erythema. No pallor. Psychiatric: She has a normal mood and affect. Her behavior is normal.   Nursing note and vitals reviewed. Diagnostic Study Results     Labs -     Recent Results (from the past 12 hour(s))   EKG, 12 LEAD, INITIAL    Collection Time: 02/05/18  1:44 PM   Result Value Ref Range    Ventricular Rate 75 BPM    Atrial Rate 75 BPM    P-R Interval 190 ms    QRS Duration 82 ms    Q-T Interval 352 ms    QTC Calculation (Bezet) 393 ms    Calculated P Axis 62 degrees    Calculated R Axis 34 degrees    Calculated T Axis 49 degrees    Diagnosis       Normal sinus rhythm  Possible Left atrial enlargement  ST & T wave abnormality, consider anterior ischemia  Abnormal ECG  No previous ECGs available     CBC WITH AUTOMATED DIFF    Collection Time: 02/05/18  1:50 PM   Result Value Ref Range    WBC 5.2 4.6 - 13.2 K/uL    RBC 4.16 (L) 4.20 - 5.30 M/uL    HGB 14.6 12.0 - 16.0 g/dL    HCT 46.2 (H) 35.0 - 45.0 %    .1 (H) 74.0 - 97.0 FL    MCH 35.1 (H) 24.0 - 34.0 PG    MCHC 31.6 31.0 - 37.0 g/dL    RDW 14.0 11.6 - 14.5 %    PLATELET 694 679 - 546 K/uL    MPV 8.6 (L) 9.2 - 11.8 FL    NEUTROPHILS 60 40 - 73 %    LYMPHOCYTES 29 21 - 52 %    MONOCYTES 9 3 - 10 %    EOSINOPHILS 2 0 - 5 %    BASOPHILS 0 0 - 2 %    ABS. NEUTROPHILS 3.1 1.8 - 8.0 K/UL    ABS. LYMPHOCYTES 1.5 0.9 - 3.6 K/UL    ABS. MONOCYTES 0.5 0.05 - 1.2 K/UL    ABS. EOSINOPHILS 0.1 0.0 - 0.4 K/UL    ABS.  BASOPHILS 0.0 0.0 - 0.06 K/UL    DF AUTOMATED     METABOLIC PANEL, COMPREHENSIVE    Collection Time: 02/05/18  1:50 PM   Result Value Ref Range    Sodium 139 136 - 145 mmol/L    Potassium 4.5 3.5 - 5.5 mmol/L    Chloride 104 100 - 108 mmol/L    CO2 23 21 - 32 mmol/L    Anion gap 12 3.0 - 18 mmol/L Glucose 91 74 - 99 mg/dL    BUN 15 7.0 - 18 MG/DL    Creatinine 0.75 0.6 - 1.3 MG/DL    BUN/Creatinine ratio 20 12 - 20      GFR est AA >60 >60 ml/min/1.73m2    GFR est non-AA >60 >60 ml/min/1.73m2    Calcium 8.9 8.5 - 10.1 MG/DL    Bilirubin, total 0.2 0.2 - 1.0 MG/DL    ALT (SGPT) 21 13 - 56 U/L    AST (SGOT) 15 15 - 37 U/L    Alk. phosphatase 87 45 - 117 U/L    Protein, total 7.5 6.4 - 8.2 g/dL    Albumin 3.7 3.4 - 5.0 g/dL    Globulin 3.8 2.0 - 4.0 g/dL    A-G Ratio 1.0 0.8 - 1.7     CARDIAC PANEL,(CK, CKMB & TROPONIN)    Collection Time: 02/05/18  1:50 PM   Result Value Ref Range     26 - 192 U/L    CK - MB 1.9 <3.6 ng/ml    CK-MB Index 1.4 0.0 - 4.0 %    Troponin-I, Qt. <0.02 0.00 - 0.06 NG/ML       Radiologic Studies -   XR SPINE THORAC 2 V   Final Result  IMPRESSION:  1. Levorotatory curvature of the midthoracic spine with associated accentuation  of usual thoracic kyphosis. Associated degenerative changes present without  acute radiographic abnormality. As read by the radiologist.      CT Results  (Last 48 hours)               02/05/18 1620  CT HIP RT WO CONT Final result    Impression:  IMPRESSION:   1. No CT evidence of right hip fracture. No evidence of hip joint effusion. Correlation with patient weightbearing status is recommended. If there is   limited weight-bearing, by the patient, and MR examination of the right hip is   recommended to assess for CT occult trabecular microfracture. 2. Left total hip arthroplasty without evidence of acute complicating feature. Narrative:  Examination: CT right hip without contrast.       HISTORY: Right hip pain, possible nondisplaced proximal right hip fracture. TECHNIQUE: CT imaging of the right hip was performed in the axial plane   utilizing both bone and soft tissue reconstruction algorithms. Additional   coronal and sagittal reformatted images were performed by the technologist and   are included in interpretation.        One or more dose reduction techniques were used on this CT: automated exposure   control, adjustment of the mAs and/or kVp according to patient's size, and   iterative reconstruction techniques. The specific techniques utilized on this CT   exam have been documented in the patient's electronic medical record. .       COMPARISON: Right hip radiographs 2/2/2018. FINDINGS:       Included lower lumbar spine demonstrates vertebral body heights within normal   limits. Intervertebral disc spaces are preserved. The sacroiliac joint and   included sacrum are within normal limits. There is partial visualization of a   left hip arthroplasty with lateral claw plate and cerclage wire construct,   without evidence of periprosthetic fracture formation. Heterotopic ossification   at the iliopsoas tendon insertion noted. With specific attention to the right hip, the osseous alignment is within normal   limits. There is no evidence of displaced right hip fracture. Similarly, no   discrete cortical break or abnormal periosteal reaction noted. No joint   effusion. Trabecular architecture is maintained. Underlying osteopenia is   present. The supra-acetabular iliac bone, anterior and posterior acetabular   walls, as well as the obturator ring are intact. Muscle bulk about the proximal right thigh is within normal limits. Prior ventral abdominal hernia repair noted with multiple suture anchors noted. No free pelvic fluid or adenopathy. CXR Results  (Last 48 hours)               02/05/18 1636  XR CHEST PA LAT Final result    Impression:  IMPRESSION: Minimal right basilar atelectasis, without superimposed acute   radiographic abnormality. Narrative:  EXAM:  XR CHEST PA LAT       INDICATION:   chest pain       COMPARISON: Same-day thoracic spine radiographs.        FINDINGS: PA and lateral radiographs of the chest demonstrate minimal linear   opacity at the right lung base, without focal pneumonic consolidation,   pneumothorax, or pleural effusion. Cardiac size and mediastinal contours are   within normal limits. No acute osseous abnormality. Degenerative changes of the   thoracic spine are again noted, better characterized on same-day thoracic spine   radiographs. Surgical clips from prior cholecystectomy noted. Medications Given in the ED:  Medications   ketorolac (TORADOL) injection 30 mg (30 mg IntraVENous Refused 2/5/18 1621)   acetaminophen-codeine (TYLENOL #3) per tablet 1 Tab (not administered)        Medical Decision Making     I am the first provider for this patient. I reviewed the vital signs, available nursing notes, past medical history, past surgical history, family history and social history. Records Reviewed: Nursing Notes, Old Medical Records, Previous Radiology Studies and Previous Laboratory Studies Reviewed previous records from [facility] and [facility]:    Previous workup from [facility] includes right hip XR, CT head, and right knee XR. Right hip XR impression: No definite fracture. However, the frontal view demonstrates questionable subtle impaction of the femoral neck. Given the degree of osteopenia, if the patient is having ongoing pain with persistent clinical suspicion for occult fracture, recommend follow-up CT. CT Head: No acute intracranial hemorrhage, mass effect, midline shift, or herniation. No definite CT evidence of acute cortical infarct is seen. Please note that noncontrast head CT may be normal in early acute infarct. Right knee XR: showed no acute process as read by Kerry Buchanan PA-C. Pending review by Radiologist.    Vital Signs-Reviewed the patient's vital signs.   Patient Vitals for the past 12 hrs:   Temp Pulse Resp BP SpO2   02/05/18 1545 - 69 15 121/76 100 %   02/05/18 1400 - 71 18 101/70 99 %   02/05/18 1340 98.1 °F (36.7 °C) 74 16 121/70 100 %       Provider Notes (Medical Decision Making):   Pt to the ED with multiple complaints, mainly chest pain that is worse with deep breath and movement, left sided back pain, and bilateral hip pain. She has been falling more frequently and has had a full work up here and at [facility] in the past 3 days. The chest pain is new and there was a concerning finding for a possible occult fracture on XR of the right hip three days ago. Obtained CT of the hip and spoke with radiologist.  CT is negative for fracture. Because Patient is WEIGHT BEARING at baseline with her walker, doubt need for MRI. All other labs and imaging studies are reassuring. Plan for discharge with small amount of pain medicines. PCP follow up. DENNISE Alvares    Pulse Oximetry Analysis - Normal 100% on RA      Cardiac Monitor:  Rate: 74 bpm  Rhythm: Normal Sinus Rhythm       EKG interpretation: (Preliminary)  1:44 PM  Rhythm: NSR. Rate (approx.): 75 bpm. No STEMI. Possible left atrial enlargement. ST & T wave abnormality. EKG read by Tigist Hunter MD      Procedures:  Procedures     ED Course:   2:05 PM Initial assessment performed. The patients presenting problems have been discussed, and they are in agreement with the care plan formulated and outlined with them. Offering no questions or concerns at this time. 5:30 PM Rounded on pt, updated her on CT and XR results, which were negative. States Toradol does not usually work for her, but Tylenol-3 dose. Ready for discharge. Will give PCP follow up. Diagnosis and Disposition       Discharge Note:  5:35 PM  Ida Valadez's  results have been reviewed with her. She has been counseled regarding her diagnosis, treatment, and plan. She verbally conveys understanding and agreement of the signs, symptoms, diagnosis, treatment and prognosis and additionally agrees to follow up as discussed. She also agrees with the care-plan and conveys that all of her questions have been answered.   I have also provided discharge instructions for her that include: educational information regarding their diagnosis and treatment, and list of reasons why they would want to return to the ED prior to their follow-up appointment, should her condition change. She has been provided with education for proper emergency department utilization. Clinical Impression:    1. Musculoskeletal chest pain    2. Acute left-sided thoracic back pain    3. Pain of both hip joints        PLAN:  1. D/C Home  2. Current Discharge Medication List      START taking these medications    Details   acetaminophen-codeine (TYLENOL-CODEINE #3) 300-30 mg per tablet Take 1 Tab by mouth every six (6) hours as needed for Pain. Max Daily Amount: 4 Tabs. Qty: 6 Tab, Refills: 0    Associated Diagnoses: Musculoskeletal chest pain; Acute left-sided thoracic back pain; Pain of both hip joints         CONTINUE these medications which have NOT CHANGED    Details   amitriptyline (ELAVIL) 75 mg tablet Take 50 mg by mouth nightly. Indications: MIGRAINE PREVENTION      pantoprazole (PROTONIX) 40 mg tablet Take 40 mg by mouth daily. Indications: gastroesophageal reflux disease      pravastatin (PRAVACHOL) 20 mg tablet Take 20 mg by mouth nightly. Indications: hypercholesterolemia      propranolol (INDERAL) 40 mg tablet Take 40 mg by mouth two (2) times a day. Indications: MIGRAINE PREVENTION      topiramate (TOPAMAX) 25 mg tablet Take 75 mg by mouth two (2) times a day. Indications: MIGRAINE PREVENTION      esomeprazole (NEXIUM) 20 mg capsule Take 20 mg by mouth daily. SUMAtriptan (IMITREX) 100 mg tablet Take 100 mg by mouth once as needed for Migraine. triazolam (HALCION) 0.25 mg tablet Take 0.25 mg by mouth nightly as needed.            3.   Follow-up Information     Follow up With Details Comments Contact Info    Roddy Alfonso MD Schedule an appointment as soon as possible for a visit in 2 days [place] [id number] [address] [facility] [address]      [facility] EMERGENCY DEPT Go to As needed, if symptoms worsen [address] [place] [id number]  [phone]        _______________________________    Attestations: This note is prepared by Bushra Carter, acting as Scribe for [employer]. [employer]:  The scribe's documentation has been prepared under my direction and personally reviewed by me in its entirety.   I confirm that the note above accurately reflects all work, treatment, procedures, and medical decision making performed by me.  _______________________________

## 2018-02-05 NOTE — ED NOTES
Patient has completed CT and is currently in xray.  in tow per patient's request.  Will medicate on return to room.

## 2018-02-05 NOTE — DISCHARGE INSTRUCTIONS
Back Pain: Care Instructions  Your Care Instructions    Back pain has many possible causes. It is often related to problems with muscles and ligaments of the back. It may also be related to problems with the nerves, discs, or bones of the back. Moving, lifting, standing, sitting, or sleeping in an awkward way can strain the back. Sometimes you don't notice the injury until later. Arthritis is another common cause of back pain. Although it may hurt a lot, back pain usually improves on its own within several weeks. Most people recover in 12 weeks or less. Using good home treatment and being careful not to stress your back can help you feel better sooner. Follow-up care is a key part of your treatment and safety. Be sure to make and go to all appointments, and call your doctor if you are having problems. It's also a good idea to know your test results and keep a list of the medicines you take. How can you care for yourself at home? · Sit or lie in positions that are most comfortable and reduce your pain. Try one of these positions when you lie down:  ¨ Lie on your back with your knees bent and supported by large pillows. ¨ Lie on the floor with your legs on the seat of a sofa or chair. Isidore Inou[place] on your side with your knees and hips bent and a pillow between your legs. ¨ Lie on your stomach if it does not make pain worse. · Do not sit up in bed, and avoid soft couches and twisted positions. Bed rest can help relieve pain at first, but it delays healing. Avoid bed rest after the first day of back pain. · Change positions every 30 minutes. If you must sit for long periods of time, take breaks from sitting. Get up and walk around, or lie in a comfortable position. · Try using a heating pad on a low or medium setting for 15 to 20 minutes every 2 or 3 hours. Try a warm shower in place of one session with the heating pad. · You can also try an ice pack for 10 to 15 minutes every 2 to 3 hours.  Put a thin cloth between the ice pack and your skin. · Take pain medicines exactly as directed. ¨ If the doctor gave you a prescription medicine for pain, take it as prescribed. ¨ If you are not taking a prescription pain medicine, ask your doctor if you can take an over-the-counter medicine. · Take short walks several times a day. You can start with 5 to 10 minutes, 3 or 4 times a day, and work up to longer walks. Walk on level surfaces and avoid hills and stairs until your back is better. · Return to work and other activities as soon as you can. Continued rest without activity is usually not good for your back. · To prevent future back pain, do exercises to stretch and strengthen your back and stomach. Learn how to use good posture, safe lifting techniques, and proper body mechanics. When should you call for help? Call your doctor now or seek immediate medical care if:  ? · You have new or worsening numbness in your legs. ? · You have new or worsening weakness in your legs. (This could make it hard to stand up.)   ? · You lose control of your bladder or bowels. ? Watch closely for changes in your health, and be sure to contact your doctor if:  ? · Your pain gets worse. ? · You are not getting better after 2 weeks. Where can you learn more? Go to http://asiya-bre.info/. Enter O708 in the search box to learn more about \"Back Pain: Care Instructions. \"  Current as of: March 21, 2017  Content Version: 11.4  © 5479-5449 [employer]. Care instructions adapted under license by [employer] (which disclaims liability or warranty for this information). If you have questions about a medical condition or this instruction, always ask your healthcare professional. [place] [address] any warranty or liability for your use of this information. Chest Pain: Care Instructions  Your Care Instructions    There are many things that can cause chest pain.  Some are not serious and will get better on their own in a few days. But some kinds of chest pain need more testing and treatment. Your doctor may have recommended a follow-up visit in the next 8 to 12 hours. If you are not getting better, you may need more tests or treatment. Even though your doctor has released you, you still need to watch for any problems. The doctor carefully checked you, but sometimes problems can develop later. If you have new symptoms or if your symptoms do not get better, get medical care right away. If you have worse or different chest pain or pressure that lasts more than 5 minutes or you passed out (lost consciousness), call 911 or seek other emergency help right away. A medical visit is only one step in your treatment. Even if you feel better, you still need to do what your doctor recommends, such as going to all suggested follow-up appointments and taking medicines exactly as directed. This will help you recover and help prevent future problems. How can you care for yourself at home? · Rest until you feel better. · Take your medicine exactly as prescribed. Call your doctor if you think you are having a problem with your medicine. · Do not drive after taking a prescription pain medicine. When should you call for help? Call 911 if:  ? · You passed out (lost consciousness). ? · You have severe difficulty breathing. ? · You have symptoms of a heart attack. These may include:  ¨ Chest pain or pressure, or a strange feeling in your chest.  ¨ Sweating. ¨ Shortness of breath. ¨ Nausea or vomiting. ¨ Pain, pressure, or a strange feeling in your back, neck, jaw, or upper belly or in one or both shoulders or arms. ¨ Lightheadedness or sudden weakness. ¨ A fast or irregular heartbeat. After you call 911, the  may tell you to chew 1 adult-strength or 2 to 4 low-dose aspirin. Wait for an ambulance. Do not try to drive yourself.    ?Call your doctor today if:  ? · You have any trouble breathing. ? · Your chest pain gets worse. ? · You are dizzy or lightheaded, or you feel like you may faint. ? · You are not getting better as expected. ? · You are having new or different chest pain. Where can you learn more? Go to http://asiya-bre.info/. Enter A120 in the search box to learn more about \"Chest Pain: Care Instructions. \"  Current as of: March 20, 2017  Content Version: 11.4  © 5867-3548 [employer]. Care instructions adapted under license by [employer] (which disclaims liability or warranty for this information). If you have questions about a medical condition or this instruction, always ask your healthcare professional. [address] any warranty or liability for your use of this information. Hip Pain: Care Instructions  Your Care Instructions    Hip pain may be caused by many things, including overuse, a fall, or a twisting movement. Another cause of hip pain is arthritis. Your pain may increase when you stand up, walk, or squat. The pain may come and go or may be constant. Home treatment can help relieve hip pain, swelling, and stiffness. If your pain is ongoing, you may need more tests and treatment. Follow-up care is a key part of your treatment and safety. Be sure to make and go to all appointments, and call your doctor if you are having problems. It's also a good idea to know your test results and keep a list of the medicines you take. How can you care for yourself at home? · Take pain medicines exactly as directed. ¨ If the doctor gave you a prescription medicine for pain, take it as prescribed. ¨ If you are not taking a prescription pain medicine, ask your doctor if you can take an over-the-counter medicine. · Rest and protect your hip. Take a break from any activity, including standing or walking, that may cause pain. · Put ice or a cold pack against your hip for 10 to 20 minutes at a time.  Try to do this every 1 to 2 hours for the next 3 days (when you are awake) or until the swelling goes down. Put a thin cloth between the ice and your skin. · Sleep on your healthy side with a pillow between your knees, or sleep on your back with pillows under your knees. · If there is no swelling, you can put moist heat, a heating pad, or a warm cloth on your hip. Do gentle stretching exercises to help keep your hip flexible. · Learn how to prevent falls. Have your vision and hearing checked regularly. Wear slippers or shoes with a nonskid sole. · Stay at a healthy weight. · Wear comfortable shoes. When should you call for help? Call 911 anytime you think you may need emergency care. For example, call if:  ? · You have sudden chest pain and shortness of breath, or you cough up blood. ? · You are not able to stand or walk or bear weight. ? · Your buttocks, legs, or feet feel numb or tingly. ? · Your leg or foot is cool or pale or changes color. ? · You have severe pain. ?Call your doctor now or seek immediate medical care if:  ? · You have signs of infection, such as:  ¨ Increased pain, swelling, warmth, or redness in the hip area. ¨ Red streaks leading from the hip area. ¨ Pus draining from the hip area. ¨ A fever. ? · You have signs of a blood clot, such as:  ¨ Pain in your calf, back of the knee, thigh, or groin. ¨ Redness and swelling in your leg or groin. ? · You are not able to bend, straighten, or move your leg normally. ? · You have trouble urinating or having bowel movements. ? Watch closely for changes in your health, and be sure to contact your doctor if:  ? · You do not get better as expected. Where can you learn more? Go to http://asiya-bre.info/. Enter V503 in the search box to learn more about \"Hip Pain: Care Instructions. \"  Current as of: March 20, 2017  Content Version: 11.4  © 2430-9989 [employer].  Care instructions adapted under license by [address] (which disclaims liability or warranty for this information). If you have questions about a medical condition or this instruction, always ask your healthcare professional. [address] any warranty or liability for your use of this information.

## 2018-02-05 NOTE — ED NOTES
Patient resting supine in bed with continuous monitoring in place. Side rails up with call bell in reach. Patient advised of plan of care. Awaiting Radiology.

## 2018-02-11 LAB
ATRIAL RATE: 75 BPM
CALCULATED P AXIS, ECG09: 62 DEGREES
CALCULATED R AXIS, ECG10: 34 DEGREES
CALCULATED T AXIS, ECG11: 49 DEGREES
DIAGNOSIS, 93000: NORMAL
P-R INTERVAL, ECG05: 190 MS
Q-T INTERVAL, ECG07: 352 MS
QRS DURATION, ECG06: 82 MS
QTC CALCULATION (BEZET), ECG08: 393 MS
VENTRICULAR RATE, ECG03: 75 BPM

## 2018-06-04 ENCOUNTER — HOME HEALTH ADMISSION (OUTPATIENT)
Dept: HOME HEALTH SERVICES | Facility: HOME HEALTH | Age: 48
End: 2018-06-04

## 2018-06-05 ENCOUNTER — HOME CARE VISIT (OUTPATIENT)
Dept: SCHEDULING | Facility: HOME HEALTH | Age: 48
End: 2018-06-05

## 2018-06-07 ENCOUNTER — HOME CARE VISIT (OUTPATIENT)
Dept: SCHEDULING | Facility: HOME HEALTH | Age: 48
End: 2018-06-07
Payer: COMMERCIAL

## 2018-06-07 PROCEDURE — G0151 HHCP-SERV OF PT,EA 15 MIN: HCPCS

## 2018-06-07 PROCEDURE — 400013 HH SOC

## 2018-06-08 ENCOUNTER — HOME CARE VISIT (OUTPATIENT)
Dept: HOME HEALTH SERVICES | Facility: HOME HEALTH | Age: 48
End: 2018-06-08
Payer: COMMERCIAL

## 2018-06-08 VITALS
RESPIRATION RATE: 17 BRPM | TEMPERATURE: 97.8 F | OXYGEN SATURATION: 96 % | SYSTOLIC BLOOD PRESSURE: 90 MMHG | HEART RATE: 65 BPM | DIASTOLIC BLOOD PRESSURE: 60 MMHG

## 2018-06-09 ENCOUNTER — HOME CARE VISIT (OUTPATIENT)
Dept: HOME HEALTH SERVICES | Facility: HOME HEALTH | Age: 48
End: 2018-06-09
Payer: COMMERCIAL

## 2018-06-12 ENCOUNTER — HOME CARE VISIT (OUTPATIENT)
Dept: SCHEDULING | Facility: HOME HEALTH | Age: 48
End: 2018-06-12
Payer: COMMERCIAL

## 2018-06-12 PROCEDURE — G0157 HHC PT ASSISTANT EA 15: HCPCS

## 2018-06-14 ENCOUNTER — HOME CARE VISIT (OUTPATIENT)
Dept: HOME HEALTH SERVICES | Facility: HOME HEALTH | Age: 48
End: 2018-06-14
Payer: COMMERCIAL

## 2018-06-15 ENCOUNTER — HOME CARE VISIT (OUTPATIENT)
Dept: SCHEDULING | Facility: HOME HEALTH | Age: 48
End: 2018-06-15
Payer: COMMERCIAL

## 2018-06-15 PROCEDURE — G0157 HHC PT ASSISTANT EA 15: HCPCS

## 2018-06-19 ENCOUNTER — HOME CARE VISIT (OUTPATIENT)
Dept: SCHEDULING | Facility: HOME HEALTH | Age: 48
End: 2018-06-19
Payer: COMMERCIAL

## 2018-06-19 PROCEDURE — G0157 HHC PT ASSISTANT EA 15: HCPCS

## 2018-06-22 ENCOUNTER — HOME CARE VISIT (OUTPATIENT)
Dept: SCHEDULING | Facility: HOME HEALTH | Age: 48
End: 2018-06-22
Payer: COMMERCIAL

## 2018-06-22 PROCEDURE — G0157 HHC PT ASSISTANT EA 15: HCPCS

## 2018-06-26 ENCOUNTER — HOME CARE VISIT (OUTPATIENT)
Dept: SCHEDULING | Facility: HOME HEALTH | Age: 48
End: 2018-06-26
Payer: COMMERCIAL

## 2018-06-26 PROCEDURE — G0157 HHC PT ASSISTANT EA 15: HCPCS

## 2018-06-28 ENCOUNTER — HOME CARE VISIT (OUTPATIENT)
Dept: HOME HEALTH SERVICES | Facility: HOME HEALTH | Age: 48
End: 2018-06-28
Payer: COMMERCIAL

## 2018-06-29 ENCOUNTER — HOME CARE VISIT (OUTPATIENT)
Dept: SCHEDULING | Facility: HOME HEALTH | Age: 48
End: 2018-06-29
Payer: COMMERCIAL

## 2018-06-29 PROCEDURE — G0151 HHCP-SERV OF PT,EA 15 MIN: HCPCS

## 2018-07-01 VITALS
RESPIRATION RATE: 17 BRPM | HEART RATE: 66 BPM | TEMPERATURE: 97.9 F | OXYGEN SATURATION: 95 % | SYSTOLIC BLOOD PRESSURE: 89 MMHG | DIASTOLIC BLOOD PRESSURE: 66 MMHG

## 2018-07-02 ENCOUNTER — HOME CARE VISIT (OUTPATIENT)
Dept: SCHEDULING | Facility: HOME HEALTH | Age: 48
End: 2018-07-02
Payer: COMMERCIAL

## 2018-07-02 PROCEDURE — G0157 HHC PT ASSISTANT EA 15: HCPCS

## 2018-07-03 ENCOUNTER — HOME CARE VISIT (OUTPATIENT)
Dept: SCHEDULING | Facility: HOME HEALTH | Age: 48
End: 2018-07-03
Payer: COMMERCIAL

## 2018-07-03 PROCEDURE — G0153 HHCP-SVS OF S/L PATH,EA 15MN: HCPCS

## 2018-07-05 ENCOUNTER — HOME CARE VISIT (OUTPATIENT)
Dept: SCHEDULING | Facility: HOME HEALTH | Age: 48
End: 2018-07-05
Payer: COMMERCIAL

## 2018-07-05 PROCEDURE — G0153 HHCP-SVS OF S/L PATH,EA 15MN: HCPCS

## 2018-07-06 ENCOUNTER — HOME CARE VISIT (OUTPATIENT)
Dept: SCHEDULING | Facility: HOME HEALTH | Age: 48
End: 2018-07-06
Payer: COMMERCIAL

## 2018-07-06 VITALS
HEART RATE: 62 BPM | OXYGEN SATURATION: 98 % | RESPIRATION RATE: 17 BRPM | TEMPERATURE: 97.8 F | SYSTOLIC BLOOD PRESSURE: 87 MMHG | DIASTOLIC BLOOD PRESSURE: 70 MMHG

## 2018-07-06 PROCEDURE — G0151 HHCP-SERV OF PT,EA 15 MIN: HCPCS

## 2018-07-09 ENCOUNTER — HOME CARE VISIT (OUTPATIENT)
Dept: SCHEDULING | Facility: HOME HEALTH | Age: 48
End: 2018-07-09
Payer: COMMERCIAL

## 2018-07-09 PROCEDURE — G0157 HHC PT ASSISTANT EA 15: HCPCS

## 2018-07-10 ENCOUNTER — HOME CARE VISIT (OUTPATIENT)
Dept: HOME HEALTH SERVICES | Facility: HOME HEALTH | Age: 48
End: 2018-07-10
Payer: COMMERCIAL

## 2018-07-11 ENCOUNTER — HOME CARE VISIT (OUTPATIENT)
Dept: HOME HEALTH SERVICES | Facility: HOME HEALTH | Age: 48
End: 2018-07-11
Payer: COMMERCIAL

## 2018-07-11 ENCOUNTER — HOME CARE VISIT (OUTPATIENT)
Dept: SCHEDULING | Facility: HOME HEALTH | Age: 48
End: 2018-07-11
Payer: COMMERCIAL

## 2018-07-11 PROCEDURE — G0157 HHC PT ASSISTANT EA 15: HCPCS

## 2018-07-11 PROCEDURE — G0152 HHCP-SERV OF OT,EA 15 MIN: HCPCS

## 2018-07-12 ENCOUNTER — HOME CARE VISIT (OUTPATIENT)
Dept: HOME HEALTH SERVICES | Facility: HOME HEALTH | Age: 48
End: 2018-07-12
Payer: COMMERCIAL

## 2018-07-12 VITALS
HEART RATE: 57 BPM | OXYGEN SATURATION: 98 % | TEMPERATURE: 97.8 F | SYSTOLIC BLOOD PRESSURE: 122 MMHG | DIASTOLIC BLOOD PRESSURE: 78 MMHG

## 2018-07-16 ENCOUNTER — HOME CARE VISIT (OUTPATIENT)
Dept: SCHEDULING | Facility: HOME HEALTH | Age: 48
End: 2018-07-16
Payer: COMMERCIAL

## 2018-07-16 PROCEDURE — G0152 HHCP-SERV OF OT,EA 15 MIN: HCPCS

## 2018-07-17 ENCOUNTER — HOME CARE VISIT (OUTPATIENT)
Dept: HOME HEALTH SERVICES | Facility: HOME HEALTH | Age: 48
End: 2018-07-17
Payer: COMMERCIAL

## 2018-07-17 VITALS
OXYGEN SATURATION: 91 % | HEART RATE: 61 BPM | DIASTOLIC BLOOD PRESSURE: 63 MMHG | SYSTOLIC BLOOD PRESSURE: 100 MMHG | TEMPERATURE: 98 F

## 2018-07-17 PROCEDURE — G0153 HHCP-SVS OF S/L PATH,EA 15MN: HCPCS

## 2018-07-19 ENCOUNTER — HOME CARE VISIT (OUTPATIENT)
Dept: SCHEDULING | Facility: HOME HEALTH | Age: 48
End: 2018-07-19
Payer: COMMERCIAL

## 2018-07-19 PROCEDURE — G0153 HHCP-SVS OF S/L PATH,EA 15MN: HCPCS

## 2018-07-24 ENCOUNTER — HOME CARE VISIT (OUTPATIENT)
Dept: SCHEDULING | Facility: HOME HEALTH | Age: 48
End: 2018-07-24
Payer: COMMERCIAL

## 2018-07-24 PROCEDURE — G0153 HHCP-SVS OF S/L PATH,EA 15MN: HCPCS

## 2018-07-24 PROCEDURE — G0152 HHCP-SERV OF OT,EA 15 MIN: HCPCS

## 2018-07-25 VITALS — SYSTOLIC BLOOD PRESSURE: 110 MMHG | DIASTOLIC BLOOD PRESSURE: 70 MMHG

## 2018-07-26 ENCOUNTER — HOME CARE VISIT (OUTPATIENT)
Dept: HOME HEALTH SERVICES | Facility: HOME HEALTH | Age: 48
End: 2018-07-26
Payer: COMMERCIAL

## 2018-07-26 PROCEDURE — G0153 HHCP-SVS OF S/L PATH,EA 15MN: HCPCS

## 2018-08-04 ENCOUNTER — HOSPITAL ENCOUNTER (OUTPATIENT)
Dept: MRI IMAGING | Age: 48
Discharge: HOME | End: 2018-08-04
Attending: ORTHOPAEDIC SURGERY
Payer: COMMERCIAL

## 2018-08-04 DIAGNOSIS — M75.52 ACUTE SHOULDER BURSITIS, LEFT: ICD-10-CM

## 2018-08-04 DIAGNOSIS — M25.512 LEFT SHOULDER PAIN, UNSPECIFIED CHRONICITY: ICD-10-CM

## 2018-08-04 PROCEDURE — 73221 MRI JOINT UPR EXTREM W/O DYE: CPT

## 2018-08-22 ENCOUNTER — HOSPITAL ENCOUNTER (OUTPATIENT)
Dept: PREADMISSION TESTING | Age: 48
Discharge: HOME | End: 2018-08-22
Payer: COMMERCIAL

## 2018-08-22 DIAGNOSIS — Z01.818 PRE-OP TESTING: ICD-10-CM

## 2018-08-22 LAB
ALBUMIN SERPL-MCNC: 3.9 G/DL (ref 3.4–5)
ALBUMIN/GLOB SERPL: 1.3 {RATIO} (ref 0.8–1.7)
ALP SERPL-CCNC: 60 U/L (ref 45–117)
ALT SERPL-CCNC: 17 U/L (ref 13–56)
ANION GAP SERPL CALC-SCNC: 14 MMOL/L (ref 3–18)
AST SERPL-CCNC: 18 U/L (ref 15–37)
ATRIAL RATE: 107 BPM
BASOPHILS # BLD: 0 K/UL (ref 0–0.1)
BASOPHILS NFR BLD: 1 % (ref 0–2)
BILIRUB SERPL-MCNC: 0.3 MG/DL (ref 0.2–1)
BUN SERPL-MCNC: 6 MG/DL (ref 7–18)
BUN/CREAT SERPL: 9 (ref 12–20)
CALCIUM SERPL-MCNC: 8.4 MG/DL (ref 8.5–10.1)
CALCULATED P AXIS, ECG09: 78 DEGREES
CALCULATED R AXIS, ECG10: 114 DEGREES
CALCULATED T AXIS, ECG11: -17 DEGREES
CHLORIDE SERPL-SCNC: 102 MMOL/L (ref 100–108)
CO2 SERPL-SCNC: 24 MMOL/L (ref 21–32)
CREAT SERPL-MCNC: 0.68 MG/DL (ref 0.6–1.3)
DIAGNOSIS, 93000: NORMAL
DIFFERENTIAL METHOD BLD: ABNORMAL
EOSINOPHIL # BLD: 0 K/UL (ref 0–0.4)
EOSINOPHIL NFR BLD: 1 % (ref 0–5)
ERYTHROCYTE [DISTWIDTH] IN BLOOD BY AUTOMATED COUNT: 13.7 % (ref 11.6–14.5)
GLOBULIN SER CALC-MCNC: 3.1 G/DL (ref 2–4)
GLUCOSE SERPL-MCNC: 88 MG/DL (ref 74–99)
HCT VFR BLD AUTO: 45.2 % (ref 35–45)
HGB BLD-MCNC: 14.6 G/DL (ref 12–16)
LYMPHOCYTES # BLD: 1.5 K/UL (ref 0.9–3.6)
LYMPHOCYTES NFR BLD: 46 % (ref 21–52)
MCH RBC QN AUTO: 35.1 PG (ref 24–34)
MCHC RBC AUTO-ENTMCNC: 32.3 G/DL (ref 31–37)
MCV RBC AUTO: 108.7 FL (ref 74–97)
MONOCYTES # BLD: 0.2 K/UL (ref 0.05–1.2)
MONOCYTES NFR BLD: 7 % (ref 3–10)
NEUTS SEG # BLD: 1.5 K/UL (ref 1.8–8)
NEUTS SEG NFR BLD: 45 % (ref 40–73)
P-R INTERVAL, ECG05: 162 MS
PLATELET # BLD AUTO: 184 K/UL (ref 135–420)
PMV BLD AUTO: 8.3 FL (ref 9.2–11.8)
POTASSIUM SERPL-SCNC: 3.2 MMOL/L (ref 3.5–5.5)
PROT SERPL-MCNC: 7 G/DL (ref 6.4–8.2)
Q-T INTERVAL, ECG07: 336 MS
QRS DURATION, ECG06: 86 MS
QTC CALCULATION (BEZET), ECG08: 448 MS
RBC # BLD AUTO: 4.16 M/UL (ref 4.2–5.3)
SODIUM SERPL-SCNC: 140 MMOL/L (ref 136–145)
VENTRICULAR RATE, ECG03: 107 BPM
WBC # BLD AUTO: 3.3 K/UL (ref 4.6–13.2)

## 2018-08-22 PROCEDURE — 36415 COLL VENOUS BLD VENIPUNCTURE: CPT | Performed by: ORTHOPAEDIC SURGERY

## 2018-08-22 PROCEDURE — 80053 COMPREHEN METABOLIC PANEL: CPT | Performed by: ORTHOPAEDIC SURGERY

## 2018-08-22 PROCEDURE — 85025 COMPLETE CBC W/AUTO DIFF WBC: CPT | Performed by: ORTHOPAEDIC SURGERY

## 2018-08-22 PROCEDURE — 93005 ELECTROCARDIOGRAM TRACING: CPT

## 2018-08-27 ENCOUNTER — HOSPITAL ENCOUNTER (OUTPATIENT)
Age: 48
Setting detail: OUTPATIENT SURGERY
Discharge: HOME | End: 2018-08-27
Attending: ORTHOPAEDIC SURGERY | Admitting: ORTHOPAEDIC SURGERY

## 2018-08-27 VITALS
HEIGHT: 59 IN | OXYGEN SATURATION: 99 % | BODY MASS INDEX: 23.63 KG/M2 | SYSTOLIC BLOOD PRESSURE: 103 MMHG | RESPIRATION RATE: 16 BRPM | WEIGHT: 117.19 LBS | HEART RATE: 95 BPM | DIASTOLIC BLOOD PRESSURE: 67 MMHG

## 2018-08-27 RX ORDER — SODIUM CHLORIDE, SODIUM LACTATE, POTASSIUM CHLORIDE, CALCIUM CHLORIDE 600; 310; 30; 20 MG/100ML; MG/100ML; MG/100ML; MG/100ML
125 INJECTION, SOLUTION INTRAVENOUS CONTINUOUS
Status: DISCONTINUED | OUTPATIENT
Start: 2018-08-27 | End: 2018-08-27 | Stop reason: HOSPADM

## 2018-08-27 NOTE — H&P
[address] Replaced by [facility] [address] [facility]  History and Physical Exam    Patient: Hussein Jaeger MRN: [id number]  SSN: [id number]    YOB: 1970  Age: 51 y.o. Sex: female      Subjective:      Chief Complaint: left shoulder pain    History of Present Illness:  Patient complains of left shoulder pain and painful range of motion.      Past Medical History:   Diagnosis Date    Adverse effect of anesthesia     Arthritis     osto    Asthma     At risk for falls     Chronic pain     \"all over\"    Chronic sinus infection     Concussion     Contusion     Difficult intubation     Ear pressure, bilateral     primary    Esophageal dysmotility     Eustachian tube dysfunction, bilateral     Fall     Fibromyalgia     Fracture of trochanter of left femur ([address]     Gastrointestinal disorder     Gastroparesis     Hiatal hernia     High cholesterol     IBS (irritable bowel syndrome)     Ill-defined condition     multi minicore myopathy    Ill-defined condition     Bilateral sensory neuro hearing loss    Interstitial cystitis     H/O    Memory loss     Migraines     Nemaline myopathy (HCC)     Neurological disorder     migraines    Nystagmus     bilateral    Osteoporosis     Perforated nasal septum     H/O    Peripheral neuropathy     Rosacea     Type C tympanogram of both ears      Past Surgical History:   Procedure Laterality Date    HX APPENDECTOMY      HX  SECTION   &     HX CHOLECYSTECTOMY      HX DILATION AND CURETTAGE      HX GI      HX GI  10/2011    G-Tube    HX GI      Removal G-tube    HX GI      EGD with esophogeal dilation, multiple    HX HEENT  2006    Sinus surgery    HX HEENT      Cricopharygeal myotomy x 2    HX HERNIA REPAIR Bilateral 2018    Ingunial    HX HERNIA REPAIR  2011    Incisional    HX HERNIA REPAIR  2008    x 2    HX HYSTERECTOMY  1995    HX KNEE ARTHROSCOPY Right 1992    HX LUMBAR LAMINECTOMY      HX ORTHOPAEDIC Left 2012    ORIF ulna and radius    HX ORTHOPAEDIC Left     revision total hip    HX ORTHOPAEDIC Right 1999    ORIF ulnar fracture    HX ORTHOPAEDIC Right 2001    Removal hardware ulna    HX ORTHOPAEDIC Right 1999    CTR    HX ORTHOPAEDIC Left 1994    CTR    HX ORTHOPAEDIC Left     multiple hip surgeries as a child    HX SALPINGO-OOPHORECTOMY Bilateral     HX TONSILLECTOMY  1988    HX UROLOGICAL      Cystoscopy with bladder biopsy and fulguration    HX UROLOGICAL  2010    stage 1 and stage 2 Interstim    HX UROLOGICAL  2011    Removal Inerstim    TOTAL HIP ARTHROPLASTY Left 1996     Social History     Occupational History    Not on file. Social History Main Topics    Smoking status: Never Smoker    Smokeless tobacco: Never Used    Alcohol use No    Drug use: No    Sexual activity: No     Prior to Admission medications    Medication Sig Start Date End Date Taking? Authorizing Provider   ergocalciferol (VITAMIN D2) 50,000 unit capsule Take 50,000 Units by mouth Every Thursday. Historical Provider   beclomethasone (QVAR) 80 mcg/actuation aero Take 2 Puffs by inhalation two (2) times a day. Historical Provider   nortriptyline (PAMELOR) 25 mg capsule Take 25 mg by mouth nightly. Historical Provider   denosumab (PROLIA) 60 mg/mL injection 60 mg by SubCUTAneous route every 6 months. Historical Provider   polyethylene glycol (MIRALAX) 17 gram/dose powder Take 17 g by mouth daily. Historical Provider   nystatin (MYCOSTATIN) 100,000 unit/mL suspension Take 1 tsp by mouth four (4) times daily as needed. swish and spit    Historical Provider   OTHER,NON-FORMULARY, Clack's compound solution one teaspoon oral route swish and spit as needed    Historical Provider   albuterol (PROAIR HFA) 90 mcg/actuation inhaler Take 2 Puffs by inhalation every four (4) hours as needed for Wheezing.     Historical Provider   albuterol sulfate (PROVENTIL;VENTOLIN) 2.5 mg/0.5 mL nebu nebulizer solution 2.5 mg by Nebulization route four (4) times daily as needed for Wheezing. Historical Provider   erenumab-aooe (AIMOVIG AUTOINJECTOR, 2 PACK,) 70 mg/mL atIn by SubCUTAneous route every thirty (30) days. Historical Provider   esomeprazole (NEXIUM) 20 mg capsule Take 20 mg by mouth two (2) times a day. Historical Provider   ketorolac (TORADOL) 30 mg/mL (1 mL) injection 30 mg by IntraMUSCular route two (2) times daily as needed (per MD). 6/23/18   Sherrell Halsted, MD   phenazopyridine (PYRIDIUM) 200 mg tablet Take 200 mg by mouth three (3) times daily as needed for Pain (for bladder spasms). Historical Provider   ondansetron (ZOFRAN ODT) 4 mg disintegrating tablet Take 4 mg by mouth every four (4) hours as needed for Nausea. Historical Provider   divalproex DR (DEPAKOTE) 250 mg tablet Take 500 mg by mouth two (2) times a day. take 2 tablets a day for 10 days then take two tablets by mouth two times a day. Historical Provider   riboflavin, vitamin B2, (VITAMIN B-2) 100 mg tablet Take 100 mg by mouth daily. Historical Provider   cyanocobalamin (VITAMIN B-12) 1,000 mcg tablet Take 1,000 mcg by mouth daily. Historical Provider   pantoprazole (PROTONIX) 40 mg tablet Take 40 mg by mouth two (2) times a day. Indications: gastroesophageal reflux disease    Chloe Webster MD   pravastatin (PRAVACHOL) 20 mg tablet Take 20 mg by mouth nightly. Indications: hypercholesterolemia    Chloe Webster MD   topiramate (TOPAMAX) 25 mg tablet Take 100 mg by mouth two (2) times a day. Indications: MIGRAINE PREVENTION    Chloe Webster MD   SUMAtriptan (IMITREX) 100 mg tablet Take 100 mg by mouth once as needed for Migraine (Repeat in 2 hours as needed). Chloe Webster MD   triazolam (HALCION) 0.25 mg tablet Take 0.125 mg by mouth nightly as needed (for sleeplessness). Chloe Webster MD       Allergies:    Allergies   Allergen Reactions    Compazine [Prochlorperazine] Seizures    Animi-3 [Om [id number]] Swelling    Hydrocodone Itching and Nausea and Vomiting    Lorabid [Loracarbef] Hives and Itching    Sulfa (Sulfonamide Antibiotics) Nausea Only    Thorazine [Chlorpromazine] Palpitations      Family History: \"Cancer,\" osteoarthritis, diabetes mellitus II, heart disease, hypertension, osteoporosis    Review of Systems:  A comprehensive review of systems was negative except for that written in the History of Present Illness. Objective:       Physical Exam:  HEENT: Normocephalic, atraumatic  Lungs:  Clear to auscultation  Heart:   Regular rate and rhythm  Abdomen: Soft  Extremities:  Pain with range of motion of the left shoulder  Neurological: Grossly neurovascularly intact    Assessment:      Left shoulder impingement syndrome. Plan:       The patient has failed previous efforts of conservative management to include non-steroidal anti-inflammatory medications, cortisone injections, physical therapy, and immobilization. Due to the fact that conservative efforts failed, the patient became a candidate for surgical intervention. Proceed with scheduled left shoulder arthroscopy with subacromial decompression. The various methods of treatment have been discussed with the patient and family. After consideration of risks, benefits, and other options for treatment, the patient has consented to surgical interventions. Questions were answered and preoperative teaching was done by Dr. Katina Whiting.      Signed By: Erma Simmons PA-C     August 27, 2018

## 2018-08-27 NOTE — NURSE NAVIGATOR
Case cancelled by anesthesia due to EKG changes. Needs cardiac clearance. Dr. Treadwell Forward notified and after Mirta Whiteside, RN spoke to patient, She will call office to reschedule and get Cardiology clearance. Anesthesia and  notified.

## 2018-09-14 ENCOUNTER — APPOINTMENT (OUTPATIENT)
Dept: GENERAL RADIOLOGY | Age: 48
End: 2018-09-14
Attending: EMERGENCY MEDICINE
Payer: COMMERCIAL

## 2018-09-14 ENCOUNTER — APPOINTMENT (OUTPATIENT)
Dept: MRI IMAGING | Age: 48
End: 2018-09-14
Attending: EMERGENCY MEDICINE
Payer: COMMERCIAL

## 2018-09-14 ENCOUNTER — HOSPITAL ENCOUNTER (EMERGENCY)
Age: 48
Discharge: HOME | End: 2018-09-15
Attending: EMERGENCY MEDICINE
Payer: COMMERCIAL

## 2018-09-14 ENCOUNTER — APPOINTMENT (OUTPATIENT)
Dept: CT IMAGING | Age: 48
End: 2018-09-14
Attending: EMERGENCY MEDICINE
Payer: COMMERCIAL

## 2018-09-14 DIAGNOSIS — G43.009 MIGRAINE WITHOUT AURA AND WITHOUT STATUS MIGRAINOSUS, NOT INTRACTABLE: Primary | ICD-10-CM

## 2018-09-14 DIAGNOSIS — R53.1 WEAKNESS: ICD-10-CM

## 2018-09-14 LAB
ALBUMIN SERPL-MCNC: 3.3 G/DL (ref 3.4–5)
ALBUMIN/GLOB SERPL: 0.9 {RATIO} (ref 0.8–1.7)
ALP SERPL-CCNC: 90 U/L (ref 45–117)
ALT SERPL-CCNC: 17 U/L (ref 13–56)
ANION GAP SERPL CALC-SCNC: 12 MMOL/L (ref 3–18)
APPEARANCE UR: CLEAR
AST SERPL-CCNC: 13 U/L (ref 15–37)
BACTERIA URNS QL MICRO: ABNORMAL /HPF
BASOPHILS # BLD: 0 K/UL (ref 0–0.1)
BASOPHILS NFR BLD: 0 % (ref 0–2)
BILIRUB SERPL-MCNC: 0.2 MG/DL (ref 0.2–1)
BILIRUB UR QL: NEGATIVE
BUN SERPL-MCNC: 8 MG/DL (ref 7–18)
BUN/CREAT SERPL: 9 (ref 12–20)
CALCIUM SERPL-MCNC: 8.4 MG/DL (ref 8.5–10.1)
CHLORIDE SERPL-SCNC: 102 MMOL/L (ref 100–108)
CK MB CFR SERPL CALC: 3 % (ref 0–4)
CK MB SERPL-MCNC: 1.6 NG/ML (ref 5–25)
CK SERPL-CCNC: 54 U/L (ref 26–192)
CO2 SERPL-SCNC: 23 MMOL/L (ref 21–32)
COLOR UR: ABNORMAL
CREAT SERPL-MCNC: 0.87 MG/DL (ref 0.6–1.3)
DIFFERENTIAL METHOD BLD: ABNORMAL
EOSINOPHIL # BLD: 0 K/UL (ref 0–0.4)
EOSINOPHIL NFR BLD: 0 % (ref 0–5)
EPITH CASTS URNS QL MICRO: ABNORMAL /LPF (ref 0–5)
ERYTHROCYTE [DISTWIDTH] IN BLOOD BY AUTOMATED COUNT: 14.4 % (ref 11.6–14.5)
GLOBULIN SER CALC-MCNC: 3.6 G/DL (ref 2–4)
GLUCOSE SERPL-MCNC: 107 MG/DL (ref 74–99)
GLUCOSE UR STRIP.AUTO-MCNC: NEGATIVE MG/DL
HCT VFR BLD AUTO: 43.2 % (ref 35–45)
HGB BLD-MCNC: 14.1 G/DL (ref 12–16)
HGB UR QL STRIP: NEGATIVE
KETONES UR QL STRIP.AUTO: NEGATIVE MG/DL
LEUKOCYTE ESTERASE UR QL STRIP.AUTO: ABNORMAL
LYMPHOCYTES # BLD: 1 K/UL (ref 0.9–3.6)
LYMPHOCYTES NFR BLD: 19 % (ref 21–52)
MAGNESIUM SERPL-MCNC: 2 MG/DL (ref 1.6–2.6)
MCH RBC QN AUTO: 34.9 PG (ref 24–34)
MCHC RBC AUTO-ENTMCNC: 32.6 G/DL (ref 31–37)
MCV RBC AUTO: 106.9 FL (ref 74–97)
MONOCYTES # BLD: 0.3 K/UL (ref 0.05–1.2)
MONOCYTES NFR BLD: 5 % (ref 3–10)
NEUTS SEG # BLD: 4.1 K/UL (ref 1.8–8)
NEUTS SEG NFR BLD: 76 % (ref 40–73)
NITRITE UR QL STRIP.AUTO: NEGATIVE
PH UR STRIP: 5.5 [PH] (ref 5–8)
PLATELET # BLD AUTO: 174 K/UL (ref 135–420)
PMV BLD AUTO: 7.9 FL (ref 9.2–11.8)
POTASSIUM SERPL-SCNC: 3.8 MMOL/L (ref 3.5–5.5)
PROT SERPL-MCNC: 6.9 G/DL (ref 6.4–8.2)
PROT UR STRIP-MCNC: NEGATIVE MG/DL
RBC # BLD AUTO: 4.04 M/UL (ref 4.2–5.3)
RBC #/AREA URNS HPF: NEGATIVE /HPF (ref 0–5)
SODIUM SERPL-SCNC: 137 MMOL/L (ref 136–145)
SP GR UR REFRACTOMETRY: >1.03 (ref 1–1.03)
TROPONIN I SERPL-MCNC: <0.02 NG/ML (ref 0–0.06)
UROBILINOGEN UR QL STRIP.AUTO: 1 EU/DL (ref 0.2–1)
WBC # BLD AUTO: 5.3 K/UL (ref 4.6–13.2)
WBC URNS QL MICRO: ABNORMAL /HPF (ref 0–5)

## 2018-09-14 PROCEDURE — 82550 ASSAY OF CK (CPK): CPT | Performed by: EMERGENCY MEDICINE

## 2018-09-14 PROCEDURE — 99285 EMERGENCY DEPT VISIT HI MDM: CPT

## 2018-09-14 PROCEDURE — 74177 CT ABD & PELVIS W/CONTRAST: CPT

## 2018-09-14 PROCEDURE — 70450 CT HEAD/BRAIN W/O DYE: CPT

## 2018-09-14 PROCEDURE — 74011636320 HC RX REV CODE- 636/320: Performed by: EMERGENCY MEDICINE

## 2018-09-14 PROCEDURE — 85025 COMPLETE CBC W/AUTO DIFF WBC: CPT | Performed by: EMERGENCY MEDICINE

## 2018-09-14 PROCEDURE — 51798 US URINE CAPACITY MEASURE: CPT

## 2018-09-14 PROCEDURE — 80053 COMPREHEN METABOLIC PANEL: CPT | Performed by: EMERGENCY MEDICINE

## 2018-09-14 PROCEDURE — 71275 CT ANGIOGRAPHY CHEST: CPT

## 2018-09-14 PROCEDURE — 96367 TX/PROPH/DG ADDL SEQ IV INF: CPT

## 2018-09-14 PROCEDURE — 96365 THER/PROPH/DIAG IV INF INIT: CPT

## 2018-09-14 PROCEDURE — 96375 TX/PRO/DX INJ NEW DRUG ADDON: CPT

## 2018-09-14 PROCEDURE — 83735 ASSAY OF MAGNESIUM: CPT | Performed by: EMERGENCY MEDICINE

## 2018-09-14 PROCEDURE — 74011250636 HC RX REV CODE- 250/636: Performed by: EMERGENCY MEDICINE

## 2018-09-14 PROCEDURE — 81001 URINALYSIS AUTO W/SCOPE: CPT | Performed by: EMERGENCY MEDICINE

## 2018-09-14 PROCEDURE — 71045 X-RAY EXAM CHEST 1 VIEW: CPT

## 2018-09-14 PROCEDURE — 93005 ELECTROCARDIOGRAM TRACING: CPT

## 2018-09-14 RX ORDER — ACETAMINOPHEN 10 MG/ML
1000 INJECTION, SOLUTION INTRAVENOUS ONCE
Status: COMPLETED | OUTPATIENT
Start: 2018-09-14 | End: 2018-09-14

## 2018-09-14 RX ORDER — METOCLOPRAMIDE HYDROCHLORIDE 5 MG/ML
10 INJECTION INTRAMUSCULAR; INTRAVENOUS
Status: DISCONTINUED | OUTPATIENT
Start: 2018-09-14 | End: 2018-09-14

## 2018-09-14 RX ORDER — PREDNISONE 10 MG/1
TABLET ORAL
COMMUNITY
End: 2018-10-08

## 2018-09-14 RX ORDER — LORAZEPAM 2 MG/ML
1 INJECTION INTRAMUSCULAR ONCE
Status: COMPLETED | OUTPATIENT
Start: 2018-09-14 | End: 2018-09-14

## 2018-09-14 RX ORDER — MAGNESIUM SULFATE HEPTAHYDRATE 40 MG/ML
2 INJECTION, SOLUTION INTRAVENOUS ONCE
Status: COMPLETED | OUTPATIENT
Start: 2018-09-14 | End: 2018-09-14

## 2018-09-14 RX ORDER — KETOROLAC TROMETHAMINE 30 MG/ML
15 INJECTION, SOLUTION INTRAMUSCULAR; INTRAVENOUS ONCE
Status: COMPLETED | OUTPATIENT
Start: 2018-09-14 | End: 2018-09-14

## 2018-09-14 RX ADMIN — LORAZEPAM 1 MG: 2 INJECTION INTRAMUSCULAR; INTRAVENOUS at 23:52

## 2018-09-14 RX ADMIN — MAGNESIUM SULFATE HEPTAHYDRATE 2 G: 40 INJECTION, SOLUTION INTRAVENOUS at 22:11

## 2018-09-14 RX ADMIN — SODIUM CHLORIDE 1000 ML: 900 INJECTION, SOLUTION INTRAVENOUS at 20:48

## 2018-09-14 RX ADMIN — KETOROLAC TROMETHAMINE 15 MG: 30 INJECTION, SOLUTION INTRAMUSCULAR at 22:40

## 2018-09-14 RX ADMIN — ACETAMINOPHEN 1000 MG: 10 INJECTION, SOLUTION INTRAVENOUS at 20:49

## 2018-09-14 RX ADMIN — IOPAMIDOL 100 ML: 755 INJECTION, SOLUTION INTRAVENOUS at 17:30

## 2018-09-14 NOTE — ED PROVIDER NOTES
EMERGENCY DEPARTMENT HISTORY AND PHYSICAL EXAM 
 
Date: 9/14/2018 Patient Name: Columba Avila History of Presenting Illness Chief Complaint Patient presents with  
 Headache History Provided By: Patient Chief Complaint: Headache Duration: 2 days Timing:  Gradual 
Location: Head Quality: Aching Severity: 9 out of 10 Modifying Factors: Pt took Imitrex (last dose 3.5 hours ago) and Toradol IM (last dose 6.5 hours ago) with no relief Associated Symptoms: nausea and vomiting; Other complaints: RLE weakness, abdominal and rectal numbness, bowel incontinence, chest pressure Additional History (Context):  
7:51 PM 
Columba Avila is a 51 y.o. female with PMHx of memory loss, migraines who presents to the emergency department C/O headache (rated 9/10), onset 2 days ago. Pt took Zofran, Imitrex (last dose 3.5 hours ago), and Toradol IM (last dose 6.5 hours ago) with no relief. Associated sxs include nausea and vomiting. States her HA is similar to her previous migraines. Pt also C/O chronic, mild chest pressure but had nuclear stress test which was normal. Pt also reports that her right leg became significantly weaker 3 weeks ago and was started on steroids by her neurologist and is scheduled for MRI in 2 weeks. Pt also adds that she has had gradual onset abdominal and rectal numbness and bowel incontinence but has made her neurologist aware. Pt has had multiple hip surgeries and muscle atrophy. Pt denies back pain, recent injuries, H/O kidney issues, or any other sxs or complaints. PCP: Erlinda Garcia MD 
 
Current Facility-Administered Medications Medication Dose Route Frequency Provider Last Rate Last Dose  iopamidol (ISOVUE 300) 61 % contrast injection 100 mL  100 mL IntraVENous RAD ONCE Juany Adhikari MD      
 
Current Outpatient Prescriptions Medication Sig Dispense Refill  lnbndau-zzeswfxyd-rfmbrkxraffx (MIDRIN) -325 mg capsule Take 1 Cap by mouth.  predniSONE (DELTASONE) 10 mg tablet Take  by mouth daily (with breakfast).  ergocalciferol (VITAMIN D2) 50,000 unit capsule Take 50,000 Units by mouth Every Thursday.  beclomethasone (QVAR) 80 mcg/actuation aero Take 2 Puffs by inhalation two (2) times a day.  nortriptyline (PAMELOR) 25 mg capsule Take 25 mg by mouth nightly.  denosumab (PROLIA) 60 mg/mL injection 60 mg by SubCUTAneous route every 6 months.  polyethylene glycol (MIRALAX) 17 gram/dose powder Take 17 g by mouth daily.  nystatin (MYCOSTATIN) 100,000 unit/mL suspension Take 1 tsp by mouth four (4) times daily as needed. swish and spit  OTHER,NON-FORMULARY, Clack's compound solution one teaspoon oral route swish and spit as needed  albuterol (PROAIR HFA) 90 mcg/actuation inhaler Take 2 Puffs by inhalation every four (4) hours as needed for Wheezing.  albuterol sulfate (PROVENTIL;VENTOLIN) 2.5 mg/0.5 mL nebu nebulizer solution 2.5 mg by Nebulization route four (4) times daily as needed for Wheezing.  erenumab-aooe (AIMOVIG AUTOINJECTOR, 2 PACK,) 70 mg/mL atIn by SubCUTAneous route every thirty (30) days.  esomeprazole (NEXIUM) 20 mg capsule Take 20 mg by mouth two (2) times a day.  ketorolac (TORADOL) 30 mg/mL (1 mL) injection 30 mg by IntraMUSCular route two (2) times daily as needed (per MD).  phenazopyridine (PYRIDIUM) 200 mg tablet Take 200 mg by mouth three (3) times daily as needed for Pain (for bladder spasms).  ondansetron (ZOFRAN ODT) 4 mg disintegrating tablet Take 4 mg by mouth every four (4) hours as needed for Nausea.  divalproex DR (DEPAKOTE) 250 mg tablet Take 500 mg by mouth two (2) times a day. take 2 tablets a day for 10 days then take two tablets by mouth two times a day.  riboflavin, vitamin B2, (VITAMIN B-2) 100 mg tablet Take 100 mg by mouth daily.  cyanocobalamin (VITAMIN B-12) 1,000 mcg tablet Take 1,000 mcg by mouth daily.  pantoprazole (PROTONIX) 40 mg tablet Take 40 mg by mouth two (2) times a day. Indications: gastroesophageal reflux disease  pravastatin (PRAVACHOL) 20 mg tablet Take 20 mg by mouth nightly. Indications: hypercholesterolemia  topiramate (TOPAMAX) 25 mg tablet Take 100 mg by mouth two (2) times a day. Indications: MIGRAINE PREVENTION    
 SUMAtriptan (IMITREX) 100 mg tablet Take 100 mg by mouth once as needed for Migraine (Repeat in 2 hours as needed).  triazolam (HALCION) 0.25 mg tablet Take 0.125 mg by mouth nightly as needed (for sleeplessness). Past History Past Medical History: 
Past Medical History:  
Diagnosis Date  Adverse effect of anesthesia  Arthritis   
 osto  Asthma  At risk for falls  Chronic pain \"all over\"  Chronic sinus infection  Concussion  Contusion  Difficult intubation  Ear pressure, bilateral   
 primary  Esophageal dysmotility  Eustachian tube dysfunction, bilateral   
 Fall  Fibromyalgia  Fracture of trochanter of left femur ([address] 2011  Gastrointestinal disorder  Gastroparesis  Hiatal hernia  High cholesterol  IBS (irritable bowel syndrome)  Ill-defined condition   
 multi minicore myopathy  Ill-defined condition Bilateral sensory neuro hearing loss  Interstitial cystitis H/O  
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   
 migraines  Nystagmus   
 bilateral  
 Osteoporosis  Perforated nasal septum H/O  
 Peripheral neuropathy  Rosacea  Type C tympanogram of both ears Past Surgical History: 
Past Surgical History:  
Procedure Laterality Date [address] [facility] [address] [place] [address]  HX GI    
 HX GI  10/2011 G-Tube  HX GI  2012 Removal G-tube  HX GI    
 EGD with esophogeal dilation, multiple  HX HEENT  2006 Sinus surgery  HX HEENT Cricopharygeal myotomy x 2  
 HX HERNIA REPAIR Bilateral 2018 Ingunial  
 HX HERNIA REPAIR  2011 Incisional  
 HX HERNIA REPAIR  2008  
 x 2  
[id number]  HX KNEE ARTHROSCOPY Right 1992  HX LUMBAR LAMINECTOMY  2009  HX ORTHOPAEDIC Left 2012 ORIF ulna and radius  HX ORTHOPAEDIC Left   
 revision total hip [address] ORIF ulnar fracture  HX ORTHOPAEDIC Right 2001 Removal hardware ulna [address] [facility]  
 HX ORTHOPAEDIC Left 1994 CTR  
 HX ORTHOPAEDIC Left   
 multiple hip surgeries as a child  HX SALPINGO-OOPHORECTOMY Bilateral   
 HX TONSILLECTOMY  1988  HX UROLOGICAL Cystoscopy with bladder biopsy and fulguration  HX UROLOGICAL  2010  
 stage 1 and stage 2 Interstim  HX UROLOGICAL  2011 Removal Inerstim [address] Family History: 
History reviewed. No pertinent family history. Social History: 
Social History Substance Use Topics  Smoking status: Never Smoker  Smokeless tobacco: Never Used  Alcohol use No  
 
 
Allergies: Allergies Allergen Reactions  Compazine [Prochlorperazine] Seizures  Animi-3 [Om [id number]] Swelling  Hydrocodone Itching and Nausea and Vomiting  Lorabid [Loracarbef] Hives and Itching  Sulfa (Sulfonamide Antibiotics) Nausea Only  Thorazine [Chlorpromazine] Palpitations Review of Systems Review of Systems Cardiovascular: Positive for chest pain (chronic, mild). Gastrointestinal: Positive for nausea and vomiting.  
     (+) bowel incontinence Musculoskeletal: Negative for back pain. Neurological: Positive for weakness (right leg (baseline)), numbness (abdomen and rectal) and headaches. All other systems reviewed and are negative. Physical Exam  
 
Vitals:  
 09/14/18 2300 09/14/18 2315 09/14/18 2330 09/14/18 2345 BP: 119/79  126/73 Pulse: 94 94 96 91 Resp: 14 19 19 22 Temp:      
SpO2: 100% 99% 100% 99% Weight:      
Height:      
 
Physical Exam  
Nursing note and vitals reviewed. Constitutional: Well appearing, no acute distress Head: Normocephalic, Atraumatic Eyes: Pupils are equal, round, and reactive to light, EOMI Neck: Supple, non-tender Cardiovascular: Mildly tachycardic rate, regular rhythm, no murmurs, rubs, or gallops Chest: Normal work of breathing and chest excursion bilaterally Lungs: Clear to ausculation bilaterally Abdomen: Soft, non tender, non distended, normoactive bowel sounds Back: No evidence of trauma or deformity Extremities: No evidence of trauma or deformity, no LE edema Skin: Warm and dry, normal cap refill Neuro: Alert and appropriate, CN intact, normal speech, UE strength and sensation full and symmetric bilaterally, 0/5 strength in the left LE and 4/5 in the right LE, patchy numbness up to the level of the umbilicus anteriorly and posteriorly and decreased rectal tone, unable to illicit reflexes in either extremity, able to ambulate with 1 person assist 
Psychiatric: Normal mood and affect Diagnostic Study Results Labs - Recent Results (from the past 12 hour(s)) URINALYSIS W/ RFLX MICROSCOPIC Collection Time: 09/14/18  8:05 PM  
Result Value Ref Range Color DARK YELLOW Appearance CLEAR Specific gravity >1.030 (H) 1.005 - 1.030  
 pH (UA) 5.5 5.0 - 8.0 Protein NEGATIVE  NEG mg/dL Glucose NEGATIVE  NEG mg/dL Ketone NEGATIVE  NEG mg/dL Bilirubin NEGATIVE  NEG Blood NEGATIVE  NEG Urobilinogen 1.0 0.2 - 1.0 EU/dL Nitrites NEGATIVE  NEG Leukocyte Esterase MODERATE (A) NEG URINE MICROSCOPIC ONLY Collection Time: 09/14/18  8:05 PM  
Result Value Ref Range WBC 6 to 10 0 - 5 /hpf  
 RBC NEGATIVE  0 - 5 /hpf Epithelial cells FEW 0 - 5 /lpf Bacteria FEW (A) NEG /hpf  
EKG, 12 LEAD, INITIAL Collection Time: 09/14/18  8:32 PM  
Result Value Ref Range Ventricular Rate 77 BPM  
 Atrial Rate 77 BPM  
 P-R Interval 160 ms QRS Duration 86 ms  
 Q-T Interval 358 ms QTC Calculation (Bezet) 405 ms Calculated P Axis 68 degrees Calculated R Axis 58 degrees Calculated T Axis 44 degrees Diagnosis Normal sinus rhythm Nonspecific ST and T wave abnormality Abnormal ECG When compared with ECG of 22-AUG-2018 10:02, Left posterior fascicular block is no longer present Criteria for Inferior-posterior infarct are no longer present T wave inversion no longer evident in Inferior leads T wave inversion less evident in Anterolateral leads CBC WITH AUTOMATED DIFF Collection Time: 09/14/18  8:38 PM  
Result Value Ref Range WBC 5.3 4.6 - 13.2 K/uL  
 RBC 4.04 (L) 4.20 - 5.30 M/uL  
 HGB 14.1 12.0 - 16.0 g/dL HCT 43.2 35.0 - 45.0 % .9 (H) 74.0 - 97.0 FL  
 MCH 34.9 (H) 24.0 - 34.0 PG  
 MCHC 32.6 31.0 - 37.0 g/dL  
 RDW 14.4 11.6 - 14.5 % PLATELET 924 804 - 279 K/uL MPV 7.9 (L) 9.2 - 11.8 FL  
 NEUTROPHILS 76 (H) 40 - 73 % LYMPHOCYTES 19 (L) 21 - 52 % MONOCYTES 5 3 - 10 % EOSINOPHILS 0 0 - 5 % BASOPHILS 0 0 - 2 %  
 ABS. NEUTROPHILS 4.1 1.8 - 8.0 K/UL  
 ABS. LYMPHOCYTES 1.0 0.9 - 3.6 K/UL  
 ABS. MONOCYTES 0.3 0.05 - 1.2 K/UL  
 ABS. EOSINOPHILS 0.0 0.0 - 0.4 K/UL  
 ABS. BASOPHILS 0.0 0.0 - 0.1 K/UL  
 DF AUTOMATED METABOLIC PANEL, COMPREHENSIVE Collection Time: 09/14/18  8:38 PM  
Result Value Ref Range Sodium 137 136 - 145 mmol/L Potassium 3.8 3.5 - 5.5 mmol/L Chloride 102 100 - 108 mmol/L  
 CO2 23 21 - 32 mmol/L Anion gap 12 3.0 - 18 mmol/L Glucose 107 (H) 74 - 99 mg/dL BUN 8 7.0 - 18 MG/DL Creatinine 0.87 0.6 - 1.3 MG/DL  
 BUN/Creatinine ratio 9 (L) 12 - 20 GFR est AA >60 >60 ml/min/1.73m2 GFR est non-AA >60 >60 ml/min/1.73m2 Calcium 8.4 (L) 8.5 - 10.1 MG/DL  Bilirubin, total 0.2 0.2 - 1.0 MG/DL  
 ALT (SGPT) 17 13 - 56 U/L  
 AST (SGOT) 13 (L) 15 - 37 U/L Alk. phosphatase 90 45 - 117 U/L Protein, total 6.9 6.4 - 8.2 g/dL Albumin 3.3 (L) 3.4 - 5.0 g/dL Globulin 3.6 2.0 - 4.0 g/dL A-G Ratio 0.9 0.8 - 1.7 CARDIAC PANEL,(CK, CKMB & TROPONIN) Collection Time: 09/14/18  8:38 PM  
Result Value Ref Range CK 54 26 - 192 U/L  
 CK - MB 1.6 <3.6 ng/ml CK-MB Index 3.0 0.0 - 4.0 % Troponin-I, Qt. <0.02 0.00 - 0.06 NG/ML  
MAGNESIUM Collection Time: 09/14/18  8:38 PM  
Result Value Ref Range Magnesium 2.0 1.6 - 2.6 mg/dL Radiologic Studies -   
CTA CHEST ABD PELV W WO CONT  
CT HEAD WO CONT  
XR CHEST PORT    (Results Pending) MRI [facility] SPINE WO CONT    (Results Pending) MRI LUMB SPINE WO CONT    (Results Pending) CT Results  (Last 48 hours) 09/14/18 2154  CTA CHEST ABD PELV W WO CONT Final result Impression:  IMPRESSION:  
   
The thoracic and abdominal aorta appear unremarkable without evidence of  
aneurysmal dilatation or dissection. No evidence of any obvious filling defect in the pulmonary arteries noted to  
suggest pulmonary embolism. No definite acute cardiopulmonary or abdominal/pelvic process identified. Evidence of prior abdominal wall hernia surgery noted. Small ventral hernia is  
present in the lower abdomen containing only fat. Other findings as described above. Narrative:  History: Chest and abdominal pain Technique:  
   
 Volumetric acquisition was obtained of from the top of the aortic arch through  
the chest, abdomen, and the pelvis down to below the symphysis using the  
standard high-resolution-infusion technique with and without intravenous  
contrast administration. Oral contrast was given. Standard coronal and sagittal  
MIP as well as candy cane parasagittal reconstructions were obtained for the  
evaluation of the aorta.   
3-D reconstructions were obtained for the entire vasculature using special  
 application. One or more dose reduction techniques were used on this CT: automated exposure  
control, adjustment of the mAs and/or kVp according to patient's size, and  
iterative reconstruction techniques. The specific techniques utilized on this CT  
exam have been documented in the patient's electronic medical record. FINDINGS:  
   
CTA:  
Thoracic aorta is mildly tortuous. Abdominal aorta unremarkable. No evidence of  
aneurysmal dilatation or dissection seen. Ascending aorta: 2.4 cm in maximal diameter Aortic arch: 2.1 cm in diameter Descending aorta: 1.8 cm in diameter Proximal abdominal aorta: 1.8 cm in diameter Mid abdominal aorta: 1.7 cm in diameter Distal abdominal aorta: 1.1 cm in diameter Bifurcation unremarkable. CT CHEST:  
   
PULMONARY VASCULATURE: No definite filling defect is seen to suggest pulmonary  
embolism. LUNGS: No suspicious pulmonary nodule, mass, or opacity. PLEURA: Normal, with no effusion or pneumothorax. AIRWAY: Unremarkable. MEDIASTINUM: Normal heart size. No pericardial effusion. LYMPH NODES: No mediastinal, hilar or axillary lymphadenopathy. OTHER: No acute osseous abnormality. CT OF THE ABDOMEN AND PELVIS:  
   
LIVER, BILIARY: Liver is normal. No biliary dilation. Cholecystectomy. PANCREAS: Normal.  
   
SPLEEN: Normal.  
   
ADRENALS: Normal.  
   
KIDNEYS/URETERS/BLADDER: There is no significant hydronephrosis. The visualized bladder appears unremarkable. PELVIC ORGANS: The uterus may have been surgically removed. VASCULATURE: Unremarkable LYMPH NODES: No enlarged lymph nodes. GASTROINTESTINAL TRACT: No bowel dilation or wall thickening. Appendix not  
visible. BONES: No acute or aggressive osseous abnormalities identified. Levoscoliosis  
thoracic spine noted. Streak artifacts from left hip arthroplasty limits  
evaluation of the surrounding tissues. OTHER: Ventral hernia lower abdomen contains only fat. Previous hernia surgery  
noted. _______________  
   
  
 09/14/18 2154  CT HEAD WO CONT Final result Impression:  IMPRESSION:  
   
No acute intracranial hemorrhage, mass effect, midline shift, or herniation. No  
definite CT evidence of acute cortical infarct is seen. Please note that  
noncontrast head CT may be normal in early acute infarct. No new abnormality is seen developing since last study. Narrative:  EXAM: CT head INDICATION: Migraine. COMPARISON: 2/2/2018. TECHNIQUE: Axial CT imaging of the head was performed without intravenous  
contrast. Coronal and sagittal reconstructions were obtained. Dose reduction: One or more dose reduction techniques were used on this CT:  
automated exposure control, adjustment of the mAs and/or kVp according to  
patient's size, and iterative reconstruction techniques. The specific techniques  
utilized on this CT exam have been documented in the patient's electronic  
medical record.  
_______________ FINDINGS:  
   
BRAIN PARENCHYMA: There is no evidence of acute intracranial hemorrhage, mass  
effect, midline shift, or herniation. No definite CT evidence of acute cortical  
infarct is seen. The gray-white matter differentiation is within normal limits. VENTRICLES/EXTRA-AXIAL SPACES/MENINGES: The ventricles and sulci are normal in  
their size and configuration. OSSEOUS STRUCTURES: No fracture is seen. PARANASAL SINUSES/MASTOIDS: Visualized paranasal sinuses and mastoid air cells  
are clear. ORBITS: The visualized orbits are unremarkable. OTHER: None.    
   
_______________ CXR Results  (Last 48 hours) None Medical Decision Making I am the first provider for this patient. I reviewed the vital signs, available nursing notes, past medical history, past surgical history, family history and social history. Vital Signs-Reviewed the patient's vital signs. Pulse Oximetry Analysis - 98% on RA Cardiac Monitor: 
Rate: 92 bpm 
Rhythm: NSR 
 
EKG interpretation: (Preliminary) 8:32 PM 
NSR at 77 bpm. QRS at 86 ms. Nonspecific ST and T wave abnormality. Deep T wave inversion V1 and V2. EKG read by Erik Rincon MD at 8:39 PM  
 
Records Reviewed: Nursing Notes, Old Medical Records, Previous Radiology Studies and Previous Laboratory Studies Provider Notes (Medical Decision Making):  
 
Procedures: 
Procedures PROCEDURE NOTE - RECTAL EXAM:  
8:33 PM 
Performed by: Erik Rincno MD 
Rectal exam performed. No stool was collected. Slightly decreased rectal tone The procedure took 1-15 minutes, and pt tolerated well. Written by [employer], ED Scribe, as dictated by Erik Rincon MD. 
 
MEDICATIONS GIVEN: 
Medications  
iopamidol (ISOVUE 300) 61 % contrast injection 100 mL (not administered)  
acetaminophen (OFIRMEV) infusion 1,000 mg (0 mg IntraVENous IV Completed 9/14/18 2135)  
sodium chloride 0.9 % bolus infusion 1,000 mL (0 mL IntraVENous IV Completed 9/14/18 2135)  
magnesium sulfate 2 g/50 ml IVPB (premix or compounded) (0 g IntraVENous IV Completed 9/14/18 2315)  
ketorolac (TORADOL) injection 15 mg (15 mg IntraVENous Given 9/14/18 2240) LORazepam (ATIVAN) injection 1 mg (1 mg IntraVENous Given 9/14/18 2352) ED Course:  
7:51 PM  
Initial assessment performed. The patients presenting problems have been discussed, and they are in agreement with the care plan formulated and outlined with them. I have encouraged them to ask questions as they arise throughout their visit. 8:44 PM Discussed patient's history, exam, and available diagnostics results with Debbie South. Anabel Chew MD, neurology, who agree with obtain CTA aorta and if negative ask for MRI lumbar thoracic without contrast. States that and MRI brain is not necessary as she had one recently. Diagnosis and Disposition Discussion: 51 y.o. female presenting with migraine and multiple neurological complaints. Discussed with her neurologist who recommended CTA and MRI of T and L-spine, all of which have been negative. Will discharge with close neurology follow up. DISCHARGE NOTE: 
3:02 AM 
Mayuri Valadez's  results have been reviewed with her. She has been counseled regarding her diagnosis, treatment, and plan. She verbally conveys understanding and agreement of the signs, symptoms, diagnosis, treatment and prognosis and additionally agrees to follow up as discussed. She also agrees with the care-plan and conveys that all of her questions have been answered. I have also provided discharge instructions for her that include: educational information regarding their diagnosis and treatment, and list of reasons why they would want to return to the ED prior to their follow-up appointment, should her condition change. She has been provided with education for proper emergency department utilization. CLINICAL IMPRESSION: 
 
1. Migraine without aura and without status migrainosus, not intractable 2. Weakness PLAN: 
1. D/C Home 2. Current Discharge Medication List  
  
 
3. Follow-up Information Follow up With Details Comments Contact Info Alexandra Maciel MD Call in 1 day For follow up with neurology [place] [address] [address] [place] [address],[place] [address] 
[phone] THE [facility] EMERGENCY DEPT  As needed, If symptoms worsen [address] [place] [id number] [phone]  
  
 
_______________________________ Attestations: This note is prepared by Miguel A Lagunas, acting as Scribe for Navi Christiansen MD. Navi Christiansen MD:  The scribe's documentation has been prepared under my direction and personally reviewed by me in its entirety. I confirm that the note above accurately reflects all work, treatment, procedures, and medical decision making performed by me. 
_______________________________

## 2018-09-15 ENCOUNTER — APPOINTMENT (OUTPATIENT)
Dept: MRI IMAGING | Age: 48
End: 2018-09-15
Attending: EMERGENCY MEDICINE
Payer: COMMERCIAL

## 2018-09-15 VITALS
OXYGEN SATURATION: 100 % | BODY MASS INDEX: 23.59 KG/M2 | SYSTOLIC BLOOD PRESSURE: 114 MMHG | HEIGHT: 59 IN | TEMPERATURE: 97.9 F | RESPIRATION RATE: 15 BRPM | DIASTOLIC BLOOD PRESSURE: 78 MMHG | HEART RATE: 88 BPM | WEIGHT: 117 LBS

## 2018-09-15 PROCEDURE — 72148 MRI LUMBAR SPINE W/O DYE: CPT

## 2018-09-15 PROCEDURE — 72146 MRI CHEST SPINE W/O DYE: CPT

## 2018-09-15 NOTE — ED NOTES
Pt discharged home stable via w/c. Pain level at discharge 4/10. Pt discharged with . Reviewed discharged instructions with pt and  who verbalized understanding. Patient armband removed and shredded No written rx's

## 2018-09-15 NOTE — ED NOTES
Pt hourly rounding competed. Safety Pt (x) resting on stretcher with side rails up and call bell in reach. () in chair 
  () in parents arms. Toileting Pt offered (x)Bedpan 
   ()Assistance to Restroom 
   ()Urinal 
Ongoing Updates Updated on plan of care and status of test results. Pain Management Inquired as to comfort and offered comfort measures: 
  (x) warm blankets (x) dimmed lights

## 2018-09-15 NOTE — DISCHARGE INSTRUCTIONS
Migraine Headache: Care Instructions  Your Care Instructions  Migraines are painful, throbbing headaches that often start on one side of the head. They may cause nausea and vomiting and make you sensitive to light, sound, or smell. Without treatment, migraines can last from 4 hours to a few days. Medicines can help prevent migraines or stop them after they have started. Your doctor can help you find which ones work best for you. Follow-up care is a key part of your treatment and safety. Be sure to make and go to all appointments, and call your doctor if you are having problems. It's also a good idea to know your test results and keep a list of the medicines you take. How can you care for yourself at home? · Do not drive if you have taken a prescription pain medicine. · Rest in a quiet, dark room until your headache is gone. Close your eyes, and try to relax or go to sleep. Don't watch TV or read. · Put a cold, moist cloth or cold pack on the painful area for 10 to 20 minutes at a time. Put a thin cloth between the cold pack and your skin. · Use a warm, moist towel or a heating pad set on low to relax tight shoulder and neck muscles. · Have someone gently massage your neck and shoulders. · Take your medicines exactly as prescribed. Call your doctor if you think you are having a problem with your medicine. You will get more details on the specific medicines your doctor prescribes. · Be careful not to take pain medicine more often than the instructions allow. You could get worse or more frequent headaches when the medicine wears off. To prevent migraines  · Keep a headache diary so you can figure out what triggers your headaches. Avoiding triggers may help you prevent headaches. Record when each headache began, how long it lasted, and what the pain was like.  (Was it throbbing, aching, stabbing, or dull?) Write down any other symptoms you had with the headache, such as nausea, flashing lights or dark spots, or sensitivity to bright light or loud noise. Note if the headache occurred near your period. List anything that might have triggered the headache. Triggers may include certain foods (chocolate, cheese, wine) or odors, smoke, bright light, stress, or lack of sleep. · If your doctor has prescribed medicine for your migraines, take it as directed. You may have medicine that you take only when you get a migraine and medicine that you take all the time to help prevent migraines. ¨ If your doctor has prescribed medicine for when you get a headache, take it at the first sign of a migraine, unless your doctor has given you other instructions. ¨ If your doctor has prescribed medicine to prevent migraines, take it exactly as prescribed. Call your doctor if you think you are having a problem with your medicine. · Find healthy ways to deal with stress. Migraines are most common during or right after stressful times. Take time to relax before and after you do something that has caused a migraine in the past.  · Try to keep your muscles relaxed by keeping good posture. Check your jaw, face, neck, and shoulder muscles for tension. Try to relax them. When you sit at a desk, change positions often. And make sure to stretch for 30 seconds each hour. · Get plenty of sleep and exercise. · Eat meals on a regular schedule. Avoid foods and drinks that often trigger migraines. These include chocolate, alcohol (especially red wine and port), aspartame, monosodium glutamate (MSG), and some additives found in foods (such as hot dogs, gonzalez, cold cuts, aged cheeses, and pickled foods). · Limit caffeine. Don't drink too much coffee, tea, or soda. But don't quit caffeine suddenly. That can also give you migraines. · Do not smoke or allow others to smoke around you. If you need help quitting, talk to your doctor about stop-smoking programs and medicines. These can increase your chances of quitting for good.   · If you are taking birth control pills or hormone therapy, talk to your doctor about whether they are triggering your migraines. When should you call for help? Call 911 anytime you think you may need emergency care. For example, call if:    · You have signs of a stroke. These may include:  ¨ Sudden numbness, paralysis, or weakness in your face, arm, or leg, especially on only one side of your body. ¨ Sudden vision changes. ¨ Sudden trouble speaking. ¨ Sudden confusion or trouble understanding simple statements. ¨ Sudden problems with walking or balance. ¨ A sudden, severe headache that is different from past headaches.    Call your doctor now or seek immediate medical care if:    · You have new or worse nausea and vomiting.     · You have a new or higher fever.     · Your headache gets much worse.    Watch closely for changes in your health, and be sure to contact your doctor if:    · You are not getting better after 2 days (48 hours). Where can you learn more? Go to http://asiya-bre.info/. Enter T462 in the search box to learn more about \"Migraine Headache: Care Instructions. \"  Current as of: October 9, 2017  Content Version: 11.7  © 6819-4093 [employer]. Care instructions adapted under license by [employer] (which disclaims liability or warranty for this information). If you have questions about a medical condition or this instruction, always ask your healthcare professional. [address] any warranty or liability for your use of this information. Weakness: Care Instructions  Your Care Instructions    Weakness is a lack of physical or muscle strength. You may feel that you need to make extra effort to move your arms, legs, or other muscles. Generalized weakness means that you feel weak in most areas of your body. Another type of weakness may affect just one muscle or group of muscles.   You may feel weak and tired after you have done too much activity, such as taking an extra-long hike. This is not a serious problem. It often goes away on its own. Feeling weak can also be caused by medical conditions like thyroid problems, depression, or a virus. Sometimes the cause can be serious. Your doctor may want to do more tests to try to find the cause of the weakness. The doctor has checked you carefully, but problems can develop later. If you notice any problems or new symptoms, get medical treatment right away. Follow-up care is a key part of your treatment and safety. Be sure to make and go to all appointments, and call your doctor if you are having problems. It's also a good idea to know your test results and keep a list of the medicines you take. How can you care for yourself at home? · Rest when you feel tired. · Be safe with medicines. If your doctor prescribed medicine, take it exactly as prescribed. Call your doctor if you think you are having a problem with your medicine. You will get more details on the specific medicines your doctor prescribes. · Do not skip meals. Eating a balanced diet may increase your energy level. · Get some physical activity every day, but do not get too tired. When should you call for help? Call your doctor now or seek immediate medical care if:    · You have new or worse weakness.     · You are dizzy or lightheaded, or you feel like you may faint.    Watch closely for changes in your health, and be sure to contact your doctor if:    · You do not get better as expected. Where can you learn more? Go to http://asiya-bre.info/. Enter 239 [id number] [id number] in the search box to learn more about \"Weakness: Care Instructions. \"  Current as of: November 20, 2017  Content Version: 11.7  © 5468-9711 [employer]. Care instructions adapted under license by [employer] (which disclaims liability or warranty for this information).  If you have questions about a medical condition or this instruction, always ask your healthcare professional. [place] [address] any warranty or liability for your use of this information.

## 2018-09-24 ENCOUNTER — HOME HEALTH ADMISSION (OUTPATIENT)
Dept: HOME HEALTH SERVICES | Facility: HOME HEALTH | Age: 48
End: 2018-09-24
Payer: COMMERCIAL

## 2018-09-25 ENCOUNTER — HOME CARE VISIT (OUTPATIENT)
Dept: HOME HEALTH SERVICES | Facility: HOME HEALTH | Age: 48
End: 2018-09-25

## 2018-09-26 ENCOUNTER — HOME CARE VISIT (OUTPATIENT)
Dept: SCHEDULING | Facility: HOME HEALTH | Age: 48
End: 2018-09-26

## 2018-09-26 ENCOUNTER — HOME CARE VISIT (OUTPATIENT)
Dept: SCHEDULING | Facility: HOME HEALTH | Age: 48
End: 2018-09-26
Payer: COMMERCIAL

## 2018-09-26 VITALS
RESPIRATION RATE: 14 BRPM | HEART RATE: 97 BPM | DIASTOLIC BLOOD PRESSURE: 40 MMHG | OXYGEN SATURATION: 97 % | SYSTOLIC BLOOD PRESSURE: 106 MMHG | TEMPERATURE: 96.4 F

## 2018-09-26 PROCEDURE — 400013 HH SOC

## 2018-09-26 PROCEDURE — G0299 HHS/HOSPICE OF RN EA 15 MIN: HCPCS

## 2018-09-27 ENCOUNTER — HOME CARE VISIT (OUTPATIENT)
Dept: HOME HEALTH SERVICES | Facility: HOME HEALTH | Age: 48
End: 2018-09-27
Payer: COMMERCIAL

## 2018-09-27 ENCOUNTER — HOME CARE VISIT (OUTPATIENT)
Dept: SCHEDULING | Facility: HOME HEALTH | Age: 48
End: 2018-09-27
Payer: COMMERCIAL

## 2018-09-27 VITALS
DIASTOLIC BLOOD PRESSURE: 65 MMHG | RESPIRATION RATE: 17 BRPM | SYSTOLIC BLOOD PRESSURE: 100 MMHG | HEART RATE: 70 BPM | TEMPERATURE: 97.8 F

## 2018-09-27 PROCEDURE — G0151 HHCP-SERV OF PT,EA 15 MIN: HCPCS

## 2018-09-27 PROCEDURE — G0153 HHCP-SVS OF S/L PATH,EA 15MN: HCPCS

## 2018-09-28 ENCOUNTER — HOSPITAL ENCOUNTER (OUTPATIENT)
Dept: MRI IMAGING | Age: 48
Discharge: HOME | End: 2018-09-28
Attending: PSYCHIATRY & NEUROLOGY
Payer: COMMERCIAL

## 2018-09-28 ENCOUNTER — HOME CARE VISIT (OUTPATIENT)
Dept: HOME HEALTH SERVICES | Facility: HOME HEALTH | Age: 48
End: 2018-09-28
Payer: COMMERCIAL

## 2018-09-28 DIAGNOSIS — G82.20 PARAPARESIS (HCC): ICD-10-CM

## 2018-09-28 DIAGNOSIS — G83.4 CAUDA EQUINA SYNDROME (HCC): ICD-10-CM

## 2018-09-28 PROCEDURE — 72149 MRI LUMBAR SPINE W/DYE: CPT

## 2018-09-28 PROCEDURE — 74011250636 HC RX REV CODE- 250/636

## 2018-09-28 PROCEDURE — A9575 INJ GADOTERATE MEGLUMI 0.1ML: HCPCS

## 2018-09-28 PROCEDURE — 72147 MRI CHEST SPINE W/DYE: CPT

## 2018-09-28 RX ORDER — GADOTERATE MEGLUMINE 376.9 MG/ML
10 INJECTION INTRAVENOUS
Status: COMPLETED | OUTPATIENT
Start: 2018-09-28 | End: 2018-09-28

## 2018-09-28 RX ADMIN — GADOTERATE MEGLUMINE 10 ML: 376.9 INJECTION INTRAVENOUS at 16:54

## 2018-10-01 ENCOUNTER — HOME CARE VISIT (OUTPATIENT)
Dept: SCHEDULING | Facility: HOME HEALTH | Age: 48
End: 2018-10-01
Payer: COMMERCIAL

## 2018-10-01 PROCEDURE — G0155 HHCP-SVS OF CSW,EA 15 MIN: HCPCS

## 2018-10-02 ENCOUNTER — HOME CARE VISIT (OUTPATIENT)
Dept: HOME HEALTH SERVICES | Facility: HOME HEALTH | Age: 48
End: 2018-10-02
Payer: COMMERCIAL

## 2018-10-04 ENCOUNTER — HOME CARE VISIT (OUTPATIENT)
Dept: SCHEDULING | Facility: HOME HEALTH | Age: 48
End: 2018-10-04
Payer: COMMERCIAL

## 2018-10-04 ENCOUNTER — HOME CARE VISIT (OUTPATIENT)
Dept: HOME HEALTH SERVICES | Facility: HOME HEALTH | Age: 48
End: 2018-10-04
Payer: COMMERCIAL

## 2018-10-04 PROCEDURE — G0153 HHCP-SVS OF S/L PATH,EA 15MN: HCPCS

## 2018-10-04 PROCEDURE — G0157 HHC PT ASSISTANT EA 15: HCPCS

## 2018-10-05 ENCOUNTER — HOME CARE VISIT (OUTPATIENT)
Dept: SCHEDULING | Facility: HOME HEALTH | Age: 48
End: 2018-10-05
Payer: COMMERCIAL

## 2018-10-05 ENCOUNTER — HOME CARE VISIT (OUTPATIENT)
Dept: HOME HEALTH SERVICES | Facility: HOME HEALTH | Age: 48
End: 2018-10-05
Payer: COMMERCIAL

## 2018-10-05 VITALS
OXYGEN SATURATION: 99 % | DIASTOLIC BLOOD PRESSURE: 72 MMHG | RESPIRATION RATE: 16 BRPM | SYSTOLIC BLOOD PRESSURE: 118 MMHG | HEART RATE: 104 BPM | TEMPERATURE: 98.5 F

## 2018-10-05 PROCEDURE — G0299 HHS/HOSPICE OF RN EA 15 MIN: HCPCS

## 2018-10-05 PROCEDURE — G0157 HHC PT ASSISTANT EA 15: HCPCS

## 2018-10-08 ENCOUNTER — HOSPITAL ENCOUNTER (OUTPATIENT)
Dept: PREADMISSION TESTING | Age: 48
Discharge: HOME | End: 2018-10-08
Payer: COMMERCIAL

## 2018-10-08 ENCOUNTER — HOME CARE VISIT (OUTPATIENT)
Dept: HOME HEALTH SERVICES | Facility: HOME HEALTH | Age: 48
End: 2018-10-08
Payer: COMMERCIAL

## 2018-10-08 VITALS — WEIGHT: 112 LBS | HEIGHT: 59 IN | BODY MASS INDEX: 22.58 KG/M2

## 2018-10-08 LAB
ALBUMIN SERPL-MCNC: 3.2 G/DL (ref 3.4–5)
ALBUMIN/GLOB SERPL: 1 {RATIO} (ref 0.8–1.7)
ALP SERPL-CCNC: 101 U/L (ref 45–117)
ALT SERPL-CCNC: 25 U/L (ref 13–56)
ANION GAP SERPL CALC-SCNC: 18 MMOL/L (ref 3–18)
AST SERPL-CCNC: 31 U/L (ref 15–37)
BASOPHILS # BLD: 0 K/UL (ref 0–0.1)
BASOPHILS NFR BLD: 0 % (ref 0–2)
BILIRUB SERPL-MCNC: 0.5 MG/DL (ref 0.2–1)
BUN SERPL-MCNC: 6 MG/DL (ref 7–18)
BUN/CREAT SERPL: 10 (ref 12–20)
CALCIUM SERPL-MCNC: 8.8 MG/DL (ref 8.5–10.1)
CHLORIDE SERPL-SCNC: 99 MMOL/L (ref 100–108)
CO2 SERPL-SCNC: 21 MMOL/L (ref 21–32)
CREAT SERPL-MCNC: 0.59 MG/DL (ref 0.6–1.3)
DIFFERENTIAL METHOD BLD: ABNORMAL
EOSINOPHIL # BLD: 0 K/UL (ref 0–0.4)
EOSINOPHIL NFR BLD: 1 % (ref 0–5)
ERYTHROCYTE [DISTWIDTH] IN BLOOD BY AUTOMATED COUNT: 18.4 % (ref 11.6–14.5)
GLOBULIN SER CALC-MCNC: 3.1 G/DL (ref 2–4)
GLUCOSE SERPL-MCNC: 68 MG/DL (ref 74–99)
HCT VFR BLD AUTO: 43.7 % (ref 35–45)
HGB BLD-MCNC: 14 G/DL (ref 12–16)
LYMPHOCYTES # BLD: 1.5 K/UL (ref 0.9–3.6)
LYMPHOCYTES NFR BLD: 37 % (ref 21–52)
MCH RBC QN AUTO: 35.3 PG (ref 24–34)
MCHC RBC AUTO-ENTMCNC: 32 G/DL (ref 31–37)
MCV RBC AUTO: 110.1 FL (ref 74–97)
MONOCYTES # BLD: 0.4 K/UL (ref 0.05–1.2)
MONOCYTES NFR BLD: 9 % (ref 3–10)
NEUTS SEG # BLD: 2.1 K/UL (ref 1.8–8)
NEUTS SEG NFR BLD: 53 % (ref 40–73)
PLATELET # BLD AUTO: 174 K/UL (ref 135–420)
PMV BLD AUTO: 8.3 FL (ref 9.2–11.8)
POTASSIUM SERPL-SCNC: 3.2 MMOL/L (ref 3.5–5.5)
PROT SERPL-MCNC: 6.3 G/DL (ref 6.4–8.2)
RBC # BLD AUTO: 3.97 M/UL (ref 4.2–5.3)
SODIUM SERPL-SCNC: 138 MMOL/L (ref 136–145)
WBC # BLD AUTO: 4 K/UL (ref 4.6–13.2)

## 2018-10-08 PROCEDURE — 85025 COMPLETE CBC W/AUTO DIFF WBC: CPT | Performed by: ORTHOPAEDIC SURGERY

## 2018-10-08 PROCEDURE — 80053 COMPREHEN METABOLIC PANEL: CPT | Performed by: ORTHOPAEDIC SURGERY

## 2018-10-08 RX ORDER — SODIUM CHLORIDE, SODIUM LACTATE, POTASSIUM CHLORIDE, CALCIUM CHLORIDE 600; 310; 30; 20 MG/100ML; MG/100ML; MG/100ML; MG/100ML
125 INJECTION, SOLUTION INTRAVENOUS CONTINUOUS
Status: CANCELLED | OUTPATIENT
Start: 2018-10-08

## 2018-10-08 NOTE — PERIOP NOTES
Had surgery in 2014, difficulty waking up, sent to ICU. Difficult intubation. Denies sleep apnea. No CPAP. Pt is partially independent. Loss use of legs about 3 months. Being worked up with neurology. Spec Pop done.

## 2018-10-09 ENCOUNTER — HOME CARE VISIT (OUTPATIENT)
Dept: SCHEDULING | Facility: HOME HEALTH | Age: 48
End: 2018-10-09
Payer: COMMERCIAL

## 2018-10-09 PROCEDURE — G0157 HHC PT ASSISTANT EA 15: HCPCS

## 2018-10-10 ENCOUNTER — HOME CARE VISIT (OUTPATIENT)
Dept: SCHEDULING | Facility: HOME HEALTH | Age: 48
End: 2018-10-10
Payer: COMMERCIAL

## 2018-10-10 PROCEDURE — G0153 HHCP-SVS OF S/L PATH,EA 15MN: HCPCS

## 2018-10-10 RX ORDER — BUDESONIDE 0.25 MG/2ML
INHALANT ORAL 2 TIMES DAILY
COMMUNITY

## 2018-10-10 RX ORDER — FLUCONAZOLE 200 MG/1
200 TABLET ORAL DAILY
COMMUNITY

## 2018-10-11 ENCOUNTER — HOME CARE VISIT (OUTPATIENT)
Dept: SCHEDULING | Facility: HOME HEALTH | Age: 48
End: 2018-10-11
Payer: COMMERCIAL

## 2018-10-11 PROCEDURE — G0157 HHC PT ASSISTANT EA 15: HCPCS

## 2018-10-11 PROCEDURE — G0153 HHCP-SVS OF S/L PATH,EA 15MN: HCPCS

## 2018-10-12 ENCOUNTER — HOME CARE VISIT (OUTPATIENT)
Dept: HOME HEALTH SERVICES | Facility: HOME HEALTH | Age: 48
End: 2018-10-12
Payer: COMMERCIAL

## 2018-10-12 ENCOUNTER — HOME CARE VISIT (OUTPATIENT)
Dept: SCHEDULING | Facility: HOME HEALTH | Age: 48
End: 2018-10-12
Payer: COMMERCIAL

## 2018-10-12 VITALS
SYSTOLIC BLOOD PRESSURE: 120 MMHG | DIASTOLIC BLOOD PRESSURE: 72 MMHG | RESPIRATION RATE: 16 BRPM | HEART RATE: 106 BPM | OXYGEN SATURATION: 94 %

## 2018-10-12 PROCEDURE — G0299 HHS/HOSPICE OF RN EA 15 MIN: HCPCS

## 2018-10-15 ENCOUNTER — HOME CARE VISIT (OUTPATIENT)
Dept: SCHEDULING | Facility: HOME HEALTH | Age: 48
End: 2018-10-15
Payer: COMMERCIAL

## 2018-10-15 PROCEDURE — G0157 HHC PT ASSISTANT EA 15: HCPCS

## 2018-10-16 ENCOUNTER — HOME CARE VISIT (OUTPATIENT)
Dept: SCHEDULING | Facility: HOME HEALTH | Age: 48
End: 2018-10-16
Payer: COMMERCIAL

## 2018-10-16 ENCOUNTER — HOME CARE VISIT (OUTPATIENT)
Dept: HOME HEALTH SERVICES | Facility: HOME HEALTH | Age: 48
End: 2018-10-16
Payer: COMMERCIAL

## 2018-10-16 PROCEDURE — G0153 HHCP-SVS OF S/L PATH,EA 15MN: HCPCS

## 2018-10-18 ENCOUNTER — HOME CARE VISIT (OUTPATIENT)
Dept: HOME HEALTH SERVICES | Facility: HOME HEALTH | Age: 48
End: 2018-10-18
Payer: COMMERCIAL

## 2018-10-18 ENCOUNTER — HOME CARE VISIT (OUTPATIENT)
Dept: SCHEDULING | Facility: HOME HEALTH | Age: 48
End: 2018-10-18
Payer: COMMERCIAL

## 2018-10-18 PROCEDURE — G0153 HHCP-SVS OF S/L PATH,EA 15MN: HCPCS

## 2018-10-18 PROCEDURE — G0157 HHC PT ASSISTANT EA 15: HCPCS

## 2018-10-19 ENCOUNTER — ANESTHESIA EVENT (OUTPATIENT)
Dept: SURGERY | Age: 48
End: 2018-10-19
Payer: COMMERCIAL

## 2018-10-19 ENCOUNTER — HOME CARE VISIT (OUTPATIENT)
Dept: SCHEDULING | Facility: HOME HEALTH | Age: 48
End: 2018-10-19
Payer: COMMERCIAL

## 2018-10-19 VITALS
DIASTOLIC BLOOD PRESSURE: 62 MMHG | HEART RATE: 95 BPM | RESPIRATION RATE: 16 BRPM | TEMPERATURE: 98.8 F | OXYGEN SATURATION: 98 % | SYSTOLIC BLOOD PRESSURE: 110 MMHG

## 2018-10-19 PROCEDURE — G0299 HHS/HOSPICE OF RN EA 15 MIN: HCPCS

## 2018-10-22 ENCOUNTER — HOSPITAL ENCOUNTER (OUTPATIENT)
Age: 48
Setting detail: OUTPATIENT SURGERY
Discharge: HOME | End: 2018-10-22
Attending: ORTHOPAEDIC SURGERY | Admitting: ORTHOPAEDIC SURGERY
Payer: COMMERCIAL

## 2018-10-22 ENCOUNTER — ANESTHESIA (OUTPATIENT)
Dept: SURGERY | Age: 48
End: 2018-10-22
Payer: COMMERCIAL

## 2018-10-22 VITALS
HEART RATE: 100 BPM | BODY MASS INDEX: 22.58 KG/M2 | SYSTOLIC BLOOD PRESSURE: 91 MMHG | HEIGHT: 59 IN | RESPIRATION RATE: 16 BRPM | DIASTOLIC BLOOD PRESSURE: 52 MMHG | OXYGEN SATURATION: 100 % | TEMPERATURE: 97.4 F | WEIGHT: 112 LBS

## 2018-10-22 PROCEDURE — 74011250636 HC RX REV CODE- 250/636: Performed by: ORTHOPAEDIC SURGERY

## 2018-10-22 PROCEDURE — 76210000006 HC OR PH I REC 0.5 TO 1 HR: Performed by: ORTHOPAEDIC SURGERY

## 2018-10-22 PROCEDURE — 74011250637 HC RX REV CODE- 250/637: Performed by: ANESTHESIOLOGY

## 2018-10-22 PROCEDURE — 77030022877 HC TU IRR ARTHRO PMP ARTH -B: Performed by: ORTHOPAEDIC SURGERY

## 2018-10-22 PROCEDURE — 74011000250 HC RX REV CODE- 250

## 2018-10-22 PROCEDURE — 77030032490 HC SLV COMPR SCD KNE COVD -B: Performed by: ORTHOPAEDIC SURGERY

## 2018-10-22 PROCEDURE — 77030034478 HC TU IRR ARTHRO PT ARTH -B: Performed by: ORTHOPAEDIC SURGERY

## 2018-10-22 PROCEDURE — 74011000250 HC RX REV CODE- 250: Performed by: ORTHOPAEDIC SURGERY

## 2018-10-22 PROCEDURE — 77030008477 HC STYL SATN SLP COVD -A: Performed by: ANESTHESIOLOGY

## 2018-10-22 PROCEDURE — 77030006643: Performed by: ANESTHESIOLOGY

## 2018-10-22 PROCEDURE — 76010000149 HC OR TIME 1 TO 1.5 HR: Performed by: ORTHOPAEDIC SURGERY

## 2018-10-22 PROCEDURE — 77030008683 HC TU ET CUF COVD -A: Performed by: ANESTHESIOLOGY

## 2018-10-22 PROCEDURE — 77030013079 HC BLNKT BAIR HGGR 3M -A: Performed by: ANESTHESIOLOGY

## 2018-10-22 PROCEDURE — 77030018835 HC SOL IRR LR ICUM -A: Performed by: ORTHOPAEDIC SURGERY

## 2018-10-22 PROCEDURE — 74011250636 HC RX REV CODE- 250/636

## 2018-10-22 PROCEDURE — 77030006877 HC BLD SHV FLL RAD S&N -B: Performed by: ORTHOPAEDIC SURGERY

## 2018-10-22 PROCEDURE — 74011250636 HC RX REV CODE- 250/636: Performed by: ANESTHESIOLOGY

## 2018-10-22 PROCEDURE — 76060000033 HC ANESTHESIA 1 TO 1.5 HR: Performed by: ORTHOPAEDIC SURGERY

## 2018-10-22 PROCEDURE — 77030018673: Performed by: ORTHOPAEDIC SURGERY

## 2018-10-22 PROCEDURE — 77030020782 HC GWN BAIR PAWS FLX 3M -B: Performed by: ORTHOPAEDIC SURGERY

## 2018-10-22 PROCEDURE — 77030018725 HC ELECTRD 90DEG DISP J&J -D: Performed by: ORTHOPAEDIC SURGERY

## 2018-10-22 PROCEDURE — 76210000023 HC REC RM PH II 2 TO 2.5 HR: Performed by: ORTHOPAEDIC SURGERY

## 2018-10-22 RX ORDER — FENTANYL CITRATE 50 UG/ML
INJECTION, SOLUTION INTRAMUSCULAR; INTRAVENOUS AS NEEDED
Status: DISCONTINUED | OUTPATIENT
Start: 2018-10-22 | End: 2018-10-22 | Stop reason: HOSPADM

## 2018-10-22 RX ORDER — ONDANSETRON 2 MG/ML
INJECTION INTRAMUSCULAR; INTRAVENOUS AS NEEDED
Status: DISCONTINUED | OUTPATIENT
Start: 2018-10-22 | End: 2018-10-22 | Stop reason: HOSPADM

## 2018-10-22 RX ORDER — ACETAMINOPHEN 10 MG/ML
1000 INJECTION, SOLUTION INTRAVENOUS ONCE
Status: COMPLETED | OUTPATIENT
Start: 2018-10-22 | End: 2018-10-22

## 2018-10-22 RX ORDER — MAGNESIUM SULFATE 100 %
4 CRYSTALS MISCELLANEOUS AS NEEDED
Status: DISCONTINUED | OUTPATIENT
Start: 2018-10-22 | End: 2018-10-22 | Stop reason: HOSPADM

## 2018-10-22 RX ORDER — MIDAZOLAM HYDROCHLORIDE 1 MG/ML
INJECTION, SOLUTION INTRAMUSCULAR; INTRAVENOUS AS NEEDED
Status: DISCONTINUED | OUTPATIENT
Start: 2018-10-22 | End: 2018-10-22 | Stop reason: HOSPADM

## 2018-10-22 RX ORDER — SODIUM CHLORIDE, SODIUM LACTATE, POTASSIUM CHLORIDE, CALCIUM CHLORIDE 600; 310; 30; 20 MG/100ML; MG/100ML; MG/100ML; MG/100ML
125 INJECTION, SOLUTION INTRAVENOUS CONTINUOUS
Status: DISCONTINUED | OUTPATIENT
Start: 2018-10-22 | End: 2018-10-22 | Stop reason: HOSPADM

## 2018-10-22 RX ORDER — ALBUTEROL SULFATE 0.83 MG/ML
2.5 SOLUTION RESPIRATORY (INHALATION) AS NEEDED
Status: DISCONTINUED | OUTPATIENT
Start: 2018-10-22 | End: 2018-10-22 | Stop reason: HOSPADM

## 2018-10-22 RX ORDER — DIPHENHYDRAMINE HYDROCHLORIDE 50 MG/ML
12.5 INJECTION, SOLUTION INTRAMUSCULAR; INTRAVENOUS
Status: DISCONTINUED | OUTPATIENT
Start: 2018-10-22 | End: 2018-10-22 | Stop reason: HOSPADM

## 2018-10-22 RX ORDER — ONDANSETRON 2 MG/ML
4 INJECTION INTRAMUSCULAR; INTRAVENOUS ONCE
Status: DISCONTINUED | OUTPATIENT
Start: 2018-10-22 | End: 2018-10-22 | Stop reason: HOSPADM

## 2018-10-22 RX ORDER — LIDOCAINE HYDROCHLORIDE 20 MG/ML
INJECTION, SOLUTION EPIDURAL; INFILTRATION; INTRACAUDAL; PERINEURAL AS NEEDED
Status: DISCONTINUED | OUTPATIENT
Start: 2018-10-22 | End: 2018-10-22 | Stop reason: HOSPADM

## 2018-10-22 RX ORDER — KETAMINE HYDROCHLORIDE 10 MG/ML
INJECTION, SOLUTION INTRAMUSCULAR; INTRAVENOUS AS NEEDED
Status: DISCONTINUED | OUTPATIENT
Start: 2018-10-22 | End: 2018-10-22 | Stop reason: HOSPADM

## 2018-10-22 RX ORDER — NALOXONE HYDROCHLORIDE 0.4 MG/ML
0.1 INJECTION, SOLUTION INTRAMUSCULAR; INTRAVENOUS; SUBCUTANEOUS AS NEEDED
Status: DISCONTINUED | OUTPATIENT
Start: 2018-10-22 | End: 2018-10-22 | Stop reason: HOSPADM

## 2018-10-22 RX ORDER — PROPOFOL 10 MG/ML
INJECTION, EMULSION INTRAVENOUS AS NEEDED
Status: DISCONTINUED | OUTPATIENT
Start: 2018-10-22 | End: 2018-10-22 | Stop reason: HOSPADM

## 2018-10-22 RX ORDER — FENTANYL CITRATE 50 UG/ML
25 INJECTION, SOLUTION INTRAMUSCULAR; INTRAVENOUS AS NEEDED
Status: DISCONTINUED | OUTPATIENT
Start: 2018-10-22 | End: 2018-10-22 | Stop reason: HOSPADM

## 2018-10-22 RX ORDER — ROCURONIUM BROMIDE 10 MG/ML
INJECTION, SOLUTION INTRAVENOUS AS NEEDED
Status: DISCONTINUED | OUTPATIENT
Start: 2018-10-22 | End: 2018-10-22 | Stop reason: HOSPADM

## 2018-10-22 RX ORDER — DEXMEDETOMIDINE HYDROCHLORIDE 4 UG/ML
INJECTION, SOLUTION INTRAVENOUS AS NEEDED
Status: DISCONTINUED | OUTPATIENT
Start: 2018-10-22 | End: 2018-10-22 | Stop reason: HOSPADM

## 2018-10-22 RX ORDER — OXYCODONE HYDROCHLORIDE 5 MG/1
5 TABLET ORAL ONCE
Status: COMPLETED | OUTPATIENT
Start: 2018-10-22 | End: 2018-10-22

## 2018-10-22 RX ORDER — FENTANYL CITRATE 50 UG/ML
INJECTION, SOLUTION INTRAMUSCULAR; INTRAVENOUS
Status: COMPLETED
Start: 2018-10-22 | End: 2018-10-22

## 2018-10-22 RX ADMIN — FENTANYL CITRATE 25 MCG: 50 INJECTION, SOLUTION INTRAMUSCULAR; INTRAVENOUS at 09:28

## 2018-10-22 RX ADMIN — PROPOFOL 100 MG: 10 INJECTION, EMULSION INTRAVENOUS at 07:49

## 2018-10-22 RX ADMIN — DEXMEDETOMIDINE HYDROCHLORIDE 4 MCG: 4 INJECTION, SOLUTION INTRAVENOUS at 07:49

## 2018-10-22 RX ADMIN — KETAMINE HYDROCHLORIDE 15 MG: 10 INJECTION, SOLUTION INTRAMUSCULAR; INTRAVENOUS at 08:45

## 2018-10-22 RX ADMIN — ROCURONIUM BROMIDE 30 MG: 10 INJECTION, SOLUTION INTRAVENOUS at 07:49

## 2018-10-22 RX ADMIN — VANCOMYCIN HYDROCHLORIDE 750 MG: 10 INJECTION, POWDER, LYOPHILIZED, FOR SOLUTION INTRAVENOUS at 06:57

## 2018-10-22 RX ADMIN — FENTANYL CITRATE 25 MCG: 50 INJECTION, SOLUTION INTRAMUSCULAR; INTRAVENOUS at 09:33

## 2018-10-22 RX ADMIN — ONDANSETRON 4 MG: 2 INJECTION INTRAMUSCULAR; INTRAVENOUS at 07:44

## 2018-10-22 RX ADMIN — FENTANYL CITRATE 25 MCG: 50 INJECTION, SOLUTION INTRAMUSCULAR; INTRAVENOUS at 09:45

## 2018-10-22 RX ADMIN — SODIUM CHLORIDE, SODIUM LACTATE, POTASSIUM CHLORIDE, AND CALCIUM CHLORIDE 125 ML/HR: 600; 310; 30; 20 INJECTION, SOLUTION INTRAVENOUS at 09:09

## 2018-10-22 RX ADMIN — KETAMINE HYDROCHLORIDE 35 MG: 10 INJECTION, SOLUTION INTRAMUSCULAR; INTRAVENOUS at 07:48

## 2018-10-22 RX ADMIN — LIDOCAINE HYDROCHLORIDE 80 MG: 20 INJECTION, SOLUTION EPIDURAL; INFILTRATION; INTRACAUDAL; PERINEURAL at 07:48

## 2018-10-22 RX ADMIN — MIDAZOLAM HYDROCHLORIDE 2 MG: 1 INJECTION, SOLUTION INTRAMUSCULAR; INTRAVENOUS at 07:39

## 2018-10-22 RX ADMIN — ACETAMINOPHEN 1000 MG: 10 INJECTION, SOLUTION INTRAVENOUS at 08:03

## 2018-10-22 RX ADMIN — SODIUM CHLORIDE, SODIUM LACTATE, POTASSIUM CHLORIDE, AND CALCIUM CHLORIDE 125 ML/HR: 600; 310; 30; 20 INJECTION, SOLUTION INTRAVENOUS at 06:35

## 2018-10-22 RX ADMIN — FENTANYL CITRATE 50 MCG: 50 INJECTION, SOLUTION INTRAMUSCULAR; INTRAVENOUS at 07:48

## 2018-10-22 RX ADMIN — FENTANYL CITRATE 25 MCG: 50 INJECTION, SOLUTION INTRAMUSCULAR; INTRAVENOUS at 09:38

## 2018-10-22 RX ADMIN — SODIUM CHLORIDE, SODIUM LACTATE, POTASSIUM CHLORIDE, AND CALCIUM CHLORIDE 125 ML/HR: 600; 310; 30; 20 INJECTION, SOLUTION INTRAVENOUS at 11:09

## 2018-10-22 RX ADMIN — DEXMEDETOMIDINE HYDROCHLORIDE 4 MCG: 4 INJECTION, SOLUTION INTRAVENOUS at 08:45

## 2018-10-22 RX ADMIN — OXYCODONE HYDROCHLORIDE 5 MG: 5 TABLET ORAL at 12:02

## 2018-10-22 NOTE — INTERVAL H&P NOTE
H&P Update: 
Alyssa Resendiz was seen and examined. History and physical has been reviewed. The patient has been examined. There have been no significant clinical changes since the completion of the originally dated History and Physical. 
Patient identified by surgeon; surgical site was confirmed by patient and surgeon.  
 
Signed By: vJ Plunkett MD   
 October 22, 2018 7:29 AM

## 2018-10-22 NOTE — PERIOP NOTES
Called holding and spoke with Jonathan Galvan RN. Informed her that the patient's Vancomycin is being delivered by pharmacy.

## 2018-10-22 NOTE — PERIOP NOTES
BP 92/59 - Roxicodone 5 mg po x 1 given for pts c/o mild pain in left shoulder -  in room - tolerating po fluids

## 2018-10-22 NOTE — PERIOP NOTES
Per Dr. Toya Geiger - give another 500 ml bolus of LR if BP within 20% of preinduction okay for discharge

## 2018-10-22 NOTE — ANESTHESIA POSTPROCEDURE EVALUATION
Procedure(s): LEFT SHOULDER ARTHROSCOPY WITH SUBACROMIAL DECOMPRESSION, EXAM UNDER ANESTHESIA. Anesthesia Post Evaluation Multimodal analgesia: multimodal analgesia used between 6 hours prior to anesthesia start to PACU discharge Patient location during evaluation: bedside Patient participation: complete - patient participated Level of consciousness: awake Pain score: 3 Pain management: adequate Airway patency: patent Anesthetic complications: no 
Cardiovascular status: acceptable Respiratory status: acceptable Hydration status: acceptable Visit Vitals /59 Pulse (!) 103 Temp 36.7 °C (98.1 °F) Resp 14 Ht 4' 11\" (1.499 m) Wt 50.8 kg (112 lb) SpO2 98% BMI 22.62 kg/m²

## 2018-10-22 NOTE — OP NOTES
[address] [id number]                               OPERATIVE REPORT    Patient: Etienne Davidson MRN: [id number]  SSN: [id number]    YOB: 1970  Age: 51 y.o. Sex: female      Date of Surgery: 10/22/2018   Preoperative Diagnosis: IMPINGEMENT SYNDROME OF LEFT SHOULDER   Postoperative Diagnosis: IMPINGEMENT SYNDROME OF LEFT SHOULDER WITH ADHESIVE CAPSULITIS  Location: [facility]  Surgeon: Laz Whitehead MD    OPERATIVE PROCEDURE: GASTON, Left shoulder arthroscopy with arthroscopic  subacromial decompression. SURGEON: Laz Whitehead MD.    COMPLICATIONS: None. ANESTHESIA: General.    ANESTHESIOLOGIST:General     ESTIMATED BLOOD LOSS: Less than 25 mL. ASSISTANT:   Gildardo Grier PA-C.    OPERATIVE PROCEDURE IN DETAIL: The patient was taken to the operating room,  placed in supine position on the operating table. After satisfactory general anesthesia was established, the patient was moved into the beach chair position. Shoulder was prepped with ChloraPrep and draped in a sterile fashion. It was noted at this time that the patient did have decreased ROM brittney in abduction and external rotation. GASTON was done at this time with a palpable giving sensation as the adhesions broke loose. Motion was markedly improved at this time. Outline of the acromion, clavicle, AC joint, coracoid process, as well as the expected portals had been marked in the preoperative hold area, and was still visible and was remarked at this time. Beginning at a point 2 cm distal and 2 cm medial to the posterolateral edge of the acromion, spinal needle was inserted into the shoulder. Shoulder was instilled with saline. Good backflow being obtained an incision was made. Arthroscope was inserted into the shoulder on the first pass. The humeral head and glenoid fossa were within normal limits.  There were mild degenerative changes in the glenoid labrum, biceps tendon was within normal limits. The rotator cuff did not show evidence of tear. Arthroscope was redirected into the subacromial space. A lateral portal was made 4 cm lateral, 1 cm posterior to the anterior edge of the acromion. Bone cutting full radius resector was inserted, viewed in the subacromial space. Subacromial decompression was done at this time. Approximately 5 to 6 mm of anterior inferior acromion was removed and smoothed in an anterior and posterior direction. Good subacromial decompression was obtained. The rotator cuff was evaluated. There were areas of partial tearing, but complete tear was not observed. Arthroscope and shaver were removed. Portals were dressed with Xeroform, followed by 4 x 4s, ABD and Microfoam tape. The patient tolerated the procedure well, transferred onto her recovery room bed, and taken the to recovery room in stable condition.                               Date:______Time:______Signature________________________________                                        Andrzej Ortiz MD

## 2018-10-22 NOTE — BRIEF OP NOTE
BRIEF OPERATIVE NOTE Date of Procedure: 10/22/2018 Preoperative Diagnosis: IMPINGEMENT SYNDROME OF LEFT SHOULDER Postoperative Diagnosis: IMPINGEMENT SYNDROME OF LEFT SHOULDER Procedure(s): LEFT SHOULDER ARTHROSCOPY WITH SUBACROMIAL DECOMPRESSION, EXAM UNDER ANESTHESIA Surgeon(s) and Role: Suman Rodriguez MD - Primary Surgical Assistant: Michel Root PA-C Surgical Staff: 
Circ-1: Maureen Stephenson RN Physician Assistant: Dorothea Azevedo PA-C Scrub Tech-1: Genie Meza Scrub RN-1: Scott John RN Scrub RN-2: Sheridan Waggoner RN Event Time In Time Out Incision Start 1320 Incision Close 4569 Anesthesia: General  
Estimated Blood Loss: Less than 25mL Specimens: * No specimens in log * Findings: Adhesive capsulitis,  Subacromial impingement Complications: None Implants: * No implants in log *

## 2018-10-22 NOTE — PERIOP NOTES
Reviewed PTA medication list with patient/caregiver and patient/caregiver denies any additional medications. Patient admits to having a responsible adult care for them at home for at least 24 hours after surgery. Pt denies having an active cough and confirms being able to lie still in the supine position for at least 20 minutes. Patient denies margo chewing/swallowing difficulties. Dual skin assessment completed with Maulik Fuentes RN.

## 2018-10-22 NOTE — PERIOP NOTES
Dr. Madhavi Knapp notified of BP 84/55 - and pts c/o pain - order for Roxicodone 5 mg po x 1 and LR bolus 500 ml

## 2018-10-22 NOTE — H&P
[address] History and Physical Exam 
 
Patient: Del Gomez MRN: [id number]  SSN: [id number] YOB: 1970  Age: 51 y.o. Sex: female Subjective: Chief Complaint: left shoulder pain History of Present Illness:  Patient complains of left shoulder pain with painful difficult range of motion. Past Medical History:  
Diagnosis Date  Adverse effect of anesthesia  Arthritis   
 osto  Asthma  At risk for falls  Chronic pain \"all over\"  Chronic sinus infection  Concussion  Contusion  Difficult intubation  Ear pressure, bilateral   
 primary  Esophageal dysmotility  Eustachian tube dysfunction, bilateral   
 Fall  Fibromyalgia  Fracture of trochanter of left femur ([address] 2011  Gastrointestinal disorder  Gastroparesis  GERD (gastroesophageal reflux disease)  Hiatal hernia  High cholesterol  IBS (irritable bowel syndrome)  Ill-defined condition   
 multi minicore myopathy  Ill-defined condition Bilateral sensory neuro hearing loss  Ill-defined condition   
 muscular dystrophy  Interstitial cystitis H/O  
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   
 migraines  Nystagmus   
 bilateral  
 Osteoporosis  Perforated nasal septum H/O  
 Peripheral neuropathy  Rosacea  Type C tympanogram of both ears Past Surgical History:  
Procedure Laterality Date 9 [address] [place] [address]  HX GI    
 HX GI  10/2011 G-Tube  HX GI  2012 Removal G-tube  HX GI    
 EGD with esophogeal dilation, multiple  HX HEENT  2006 Sinus surgery  HX HEENT Cricopharygeal myotomy x 2  
 HX HERNIA REPAIR Bilateral 2014 Ingunial  
 HX HERNIA REPAIR  2011 Incisional  
 HX HERNIA REPAIR  2008 x 3  
 HX HERNIA REPAIR    
 X [facility]  HX KNEE ARTHROSCOPY Right 1992  HX LUMBAR LAMINECTOMY  2009  HX ORTHOPAEDIC Left 2012 ORIF ulna and radius  HX ORTHOPAEDIC Left   
 revision total hip [address] ORIF ulnar fracture  HX ORTHOPAEDIC Right 2001 Removal hardware ulna [address] CTR  
 HX ORTHOPAEDIC Left 1994 CTR  
 HX ORTHOPAEDIC Left   
 multiple hip surgeries as a child  HX ORTHOPAEDIC  2014  
 left hip revision  HX SALPINGO-OOPHORECTOMY Bilateral   
 HX TONSILLECTOMY  1988  HX UROLOGICAL Cystoscopy with bladder biopsy and fulguration  HX UROLOGICAL  2010  
 stage 1 and stage 2 Interstim  HX UROLOGICAL  2011 Removal Inerstim [address] Social History Occupational History  Not on file Tobacco Use  Smoking status: Former Smoker Last attempt to quit: 10/8/1996 Years since quittin.0  Smokeless tobacco: Never Used Substance and Sexual Activity  Alcohol use: No  
 Drug use: No  
 Sexual activity: No  
 
Prior to Admission medications Medication Sig Start Date End Date Taking? Authorizing Provider  
fluconazole (DIFLUCAN) 200 mg tablet Take 200 mg by mouth daily. FDA advises cautious prescribing of oral fluconazole in pregnancy. Indications: x 2 weeks    Provider, Historical  
budesonide (PULMICORT) 0.25 mg/2 mL nbsp by Nebulization route two (2) times a day. Provider, Historical  
rmupxgh-npizwpshn-jvyhhwacawsg (MIDRIN) -325 mg capsule Take 1 Cap by mouth four (4) times daily as needed for Migraine. Other, MD Chloe  
ergocalciferol (VITAMIN D2) 50,000 unit capsule Take 50,000 Units by mouth Every Thursday. Provider, Historical  
nortriptyline (PAMELOR) 25 mg capsule Take 25 mg by mouth nightly.     Provider, Historical  
denosumab (PROLIA) 60 mg/mL injection 60 mg by SubCUTAneous route every 6 months. Provider, Historical  
polyethylene glycol (MIRALAX) 17 gram/dose powder Take 17 g by mouth daily. Provider, Historical  
nystatin (MYCOSTATIN) 100,000 unit/mL suspension Take 1 tsp by mouth four (4) times daily as needed (pain). swish and spit     Provider, Historical  
OTHER,NON-FORMULARY, Clack's compound solution one teaspoon oral route swish and spit as needed    Provider, Historical  
albuterol (PROAIR HFA) 90 mcg/actuation inhaler Take 2 Puffs by inhalation every four (4) hours as needed for Wheezing. Provider, Historical  
albuterol sulfate (PROVENTIL;VENTOLIN) 2.5 mg/0.5 mL nebu nebulizer solution 2.5 mg by Nebulization route four (4) times daily as needed for Wheezing. Provider, Historical  
erenumab-aooe (AIMOVIG AUTOINJECTOR, 2 PACK,) 70 mg/mL atIn by SubCUTAneous route every thirty (30) days. Provider, Historical  
esomeprazole (NEXIUM) 20 mg capsule Take 20 mg by mouth two (2) times a day. Provider, Historical  
ketorolac (TORADOL) 30 mg/mL (1 mL) injection 30 mg by IntraMUSCular route two (2) times daily as needed (Pain). 6/23/18   Ollie Hager MD  
phenazopyridine (PYRIDIUM) 200 mg tablet Take 200 mg by mouth three (3) times daily as needed for Pain (for bladder spasms). Provider, Historical  
ondansetron (ZOFRAN ODT) 4 mg disintegrating tablet Take 4 mg by mouth every four (4) hours as needed for Nausea. Provider, Historical  
divalproex DR (DEPAKOTE) 250 mg tablet Take 500 mg by mouth two (2) times a day. take 2 tablets a day for 10 days then take two tablets by mouth two times a day. Provider, Historical  
riboflavin, vitamin B2, (VITAMIN B-2) 100 mg tablet Take 100 mg by mouth daily. Provider, Historical  
cyanocobalamin (VITAMIN B-12) 1,000 mcg tablet Take 1,000 mcg by mouth daily. Provider, Historical  
pantoprazole (PROTONIX) 40 mg tablet Take 40 mg by mouth two (2) times a day.  Indications: gastroesophageal reflux disease    Other, MD Chloe  
 pravastatin (PRAVACHOL) 20 mg tablet Take 20 mg by mouth nightly. Indications: hypercholesterolemia    Chloe Webster MD  
topiramate (TOPAMAX) 25 mg tablet Take 100 mg by mouth two (2) times a day. Indications: MIGRAINE PREVENTION    Chloe Webster MD  
SUMAtriptan (IMITREX) 100 mg tablet Take 100 mg by mouth once as needed for Migraine (Repeat in 2 hours as needed). Chloe Webster MD  
triazolam (HALCION) 0.25 mg tablet Take 0.125 mg by mouth nightly as needed (for sleeplessness). Chloe Webster MD  
 
 
Allergies: Allergies Allergen Reactions  Compazine [Prochlorperazine] Seizures  Animi-3 [Om 3-Dha-Epa-V20-Bd-Q1-Mkuopcl] Swelling  Hydrocodone Itching and Nausea and Vomiting  Lorabid [Loracarbef] Hives and Itching  Sulfa (Sulfonamide Antibiotics) Nausea Only  Thorazine [Chlorpromazine] Palpitations Family History: \"cancer,\" arthritis, diabetes mellitus II, heart disease, hypertension, osteoporosis Review of Systems: A comprehensive review of systems was negative except for that written in the History of Present Illness. Objective:   
  
Physical Exam: 
HEENT: Normocephalic, atraumatic Lungs:  Clear to auscultation Heart:   Regular rate and rhythm Abdomen: Soft Extremities:  Pain with range of motion of the left shoulder Neurological: Grossly neurovascularly intact Assessment:   
 
Impingement syndrome, left shoulder. Plan: The patient has failed previous efforts of conservative management to include non-steroidal anti-inflammatory medications, cortisone injections and physical therapy. Due to the fact that conservative efforts failed, the patient became a candidate for surgical intervention. Proceed with scheduled left shoulder arthroscopy and subacromial decompression. The various methods of treatment have been discussed with the patient and family.  After consideration of risks, benefits, and other options for treatment, the patient has consented to surgical interventions. Questions were answered and preoperative teaching was done by Dr. Gwendolyn Cooper. Signed By: Cheyanne Cho PA-C October 21, 2018

## 2018-10-22 NOTE — DISCHARGE INSTRUCTIONS
Post operative Instructions for Shoulder Arthroscopy   1. You may remove the surgical dressing 2 days after surgery. It is okay to shower and get the incision wet at this time, but no soaking or bathing. Steri strips may be removed if necessary. You can place band-aids over the incisions and apply an Ace wrap if necessary. 2. Elevate the operative extremity and apply ice as often as possible to help reduce swelling. This also increases blood flow to help improve healing. 3. You may begin gentle range of motion exercises of the operative arm as tolerated. No heavy pushing/pulling/lifting using the operative arm for at least 6 weeks post-operatively  4. All patients should begin straight leg raises the day of surgery, approximately 30 every hour. 5. It is important to get up and walk around for 3-5 minutes every hour while you are awake. This will decrease the risk of blood clot. 6. If you develop fever of greater than 101.4 or chills, please contact our office immediately, [phone].   7. Take the pain medication as prescribed. Maximum dose of Acetaminophen in a 24 hr period is 4 grams. Larger doses can cause liver impairment. Be aware that over the counter products can contain acetaminophen. 8. Follow up in 7-10 days. Call to schedule appointment. [phone]. DISCHARGE SUMMARY from Nurse    PATIENT INSTRUCTIONS:    After general anesthesia or intravenous sedation, for 24 hours or while taking prescription Narcotics:  · Limit your activities  · Do not drive and operate hazardous machinery  · Do not make important personal or business decisions  · Do  not drink alcoholic beverages  · If you have not urinated within 8 hours after discharge, please contact your surgeon on call.     Report the following to your surgeon:  · Excessive pain, swelling, redness or odor of or around the surgical area  · Temperature over 100.5  · Nausea and vomiting lasting longer than 4 hours or if unable to take medications  · Any signs of decreased circulation or nerve impairment to extremity: change in color, persistent  numbness, tingling, coldness or increase pain  · Any questions    What to do at Home:  Recommended activity: Ambulate in house and No lifting, Driving, or Strenuous exercise until advised,     If you experience any of the following symptoms above, please follow up with Dr. Anum Walton. *  Please give a list of your current medications to your Primary Care Provider. *  Please update this list whenever your medications are discontinued, doses are      changed, or new medications (including over-the-counter products) are added. *  Please carry medication information at all times in case of emergency situations. These are general instructions for a healthy lifestyle:    No smoking/ No tobacco products/ Avoid exposure to second hand smoke  Surgeon General's Warning:  Quitting smoking now greatly reduces serious risk to your health. Obesity, smoking, and sedentary lifestyle greatly increases your risk for illness    A healthy diet, regular physical exercise & weight monitoring are important for maintaining a healthy lifestyle    You may be retaining fluid if you have a history of heart failure or if you experience any of the following symptoms:  Weight gain of 3 pounds or more overnight or 5 pounds in a week, increased swelling in our hands or feet or shortness of breath while lying flat in bed. Please call your doctor as soon as you notice any of these symptoms; do not wait until your next office visit. Recognize signs and symptoms of STROKE:    F-face looks uneven    A-arms unable to move or move unevenly    S-speech slurred or non-existent    T-time-call 911 as soon as signs and symptoms begin-DO NOT go       Back to bed or wait to see if you get better-TIME IS BRAIN. Warning Signs of HEART ATTACK     Call 911 if you have these symptoms:   Chest discomfort.  Most heart attacks involve discomfort in the center of the chest that lasts more than a few minutes, or that goes away and comes back. It can feel like uncomfortable pressure, squeezing, fullness, or pain.  Discomfort in other areas of the upper body. Symptoms can include pain or discomfort in one or both arms, the back, neck, jaw, or stomach.  Shortness of breath with or without chest discomfort.  Other signs may include breaking out in a cold sweat, nausea, or lightheadedness. Don't wait more than five minutes to call 911 - MINUTES MATTER! Fast action can save your life. Calling 911 is almost always the fastest way to get lifesaving treatment. Emergency Medical Services staff can begin treatment when they arrive -- up to an hour sooner than if someone gets to the hospital by car. Patient armband removed and shredded    The discharge information has been reviewed with the patient and caregiver. The patient and caregiver verbalized understanding. Discharge medications reviewed with the patient and caregiver and appropriate educational materials and side effects teaching were provided.   ___________________________________________________________________________________________________________________________________

## 2018-10-22 NOTE — ANESTHESIA PREPROCEDURE EVALUATION
Anesthetic History Increased risk of difficult airway and malignant hyperthermia Review of Systems / Medical History Patient summary reviewed, nursing notes reviewed and pertinent labs reviewed Pulmonary Asthma Neuro/Psych Neuromuscular disease Comments: Paralysis lower extremities Muscular Dystrony Cardiovascular Exercise tolerance: <4 METS 
  
GI/Hepatic/Renal 
  
GERD Endo/Other Arthritis Other Findings Physical Exam 
 
Airway Mallampati: IV 
TM Distance: < 4 cm Neck ROM: short neck Mouth opening: Diminished (comment) Cardiovascular Regular rate and rhythm,  S1 and S2 normal,  no murmur, click, rub, or gallop Dental 
No notable dental hx Pulmonary Breath sounds clear to auscultation Abdominal 
Abdominal exam normal 
 
 
 Other Findings Anesthetic Plan ASA: 3 Patient did not consent to regional anesthesiaAnesthesia type: general 
 
 
 
 
Induction: Intravenous Anesthetic plan and risks discussed with: Patient and Spouse

## 2018-10-22 NOTE — PERIOP NOTES
Paged Mason Lowe and informed him that the patient states she has been having a lot of difficulty swallowing lately. Mason Lowe states that he was aware of that as well as the fact that the patient has a history of malignant hyperthermia.

## 2018-10-22 NOTE — PERIOP NOTES
Attempting to contact family member without success- went to all waiting rooms in surgical pavilion , called cell phone multiple times, called 2 [place] waiting room, cafeteria and [place] - without success - pt upset and crying - approval from [address] to have overhead page - pts  shows up after overhead page  - stated \"I was in room 201 waiting\"

## 2018-10-22 NOTE — PERIOP NOTES
TRANSFER - OUT REPORT: 
 
Verbal report given to Christiane(name) on Colin  being transferred to phase 2(unit) for routine post - op Report consisted of patients Situation, Background, Assessment and  
Recommendations(SBAR). Information from the following report(s) SBAR, Kardex, OR Summary, Procedure Summary, Intake/Output and MAR was reviewed with the receiving nurse. Lines:  
Peripheral IV 10/22/18 Right Hand (Active) Site Assessment Clean, dry, & intact 10/22/2018  9:31 AM  
Phlebitis Assessment 0 10/22/2018  9:31 AM  
Infiltration Assessment 0 10/22/2018  9:31 AM  
Dressing Status Clean, dry, & intact 10/22/2018  9:31 AM  
Dressing Type Transparent;Tape 10/22/2018  9:31 AM  
Hub Color/Line Status Blue; Infusing 10/22/2018  9:31 AM  
Alcohol Cap Used No 10/22/2018  6:34 AM  
  
 
Opportunity for questions and clarification was provided. Patient transported with: 
 Registered Nurse Tech

## 2018-10-26 LAB
ATRIAL RATE: 77 BPM
CALCULATED P AXIS, ECG09: 68 DEGREES
CALCULATED R AXIS, ECG10: 58 DEGREES
CALCULATED T AXIS, ECG11: 44 DEGREES
DIAGNOSIS, 93000: NORMAL
P-R INTERVAL, ECG05: 160 MS
Q-T INTERVAL, ECG07: 358 MS
QRS DURATION, ECG06: 86 MS
QTC CALCULATION (BEZET), ECG08: 405 MS
VENTRICULAR RATE, ECG03: 77 BPM

## 2021-04-20 NOTE — ED TRIAGE NOTES
C/o chest pain and spine pain, pt has been seen at [facility] after a fall and here on 2/2/18, states she couldn't make an appointment with her doctor so she came here. Sepsis Screening completed    (  )Patient meets SIRS criteria. (x  )Patient does not meet SIRS criteria.       SIRS Criteria is achieved when two or more of the following are present   Temperature < 96.8°F (36°C) or > 100.9°F (38.3°C)   Heart Rate > 90 beats per minute   Respiratory Rate > 20 breaths per minute   WBC count > 12,000 or <4,000 or > 10% bands
73

## (undated) DEVICE — 3M™ IOBAN™ 2 ANTIMICROBIAL INCISE DRAPE 6650EZ: Brand: IOBAN™ 2

## (undated) DEVICE — NEEDLE HYPO 22GA L1.5IN BLK S STL HUB POLYPR SHLD REG BVL

## (undated) DEVICE — (D)PREP SKN CHLRAPRP APPL 26ML -- CONVERT TO ITEM 371833

## (undated) DEVICE — ELECTRODE RF DIA4MM 90DEG SUCT W/ INTEGR HNDPC VAPR S90

## (undated) DEVICE — TUBE IRRIG L8IN LNG PT W/ CONN FOR PMP SYS REDEUCE

## (undated) DEVICE — TUBING PMP L8FT LNG W/ CONN FOR AR-6400 REDEUCE

## (undated) DEVICE — SOLUTION IRRIG 3000ML LAC R FLX CONT

## (undated) DEVICE — DRAPE,SHOULDER,BEACH CHAIR,STERILE: Brand: MEDLINE

## (undated) DEVICE — DEVON™ KNEE AND BODY STRAP 60" X 3" (1.5 M X 7.6 CM): Brand: DEVON

## (undated) DEVICE — DYONICS 5.5 FULL RADIUS BONECUTTER                                    BLADES, ORANGE, 8000 MAXIMUM RPM,                                    PACKAGED 6 PER BOX, STERILE

## (undated) DEVICE — SYR 50ML LR LCK 1ML GRAD NSAF --

## (undated) DEVICE — GOWN,AURORA,NONRNF,XL,30/CS: Brand: MEDLINE

## (undated) DEVICE — KNEE ARTHROSCOPY III-LF: Brand: MEDLINE INDUSTRIES, INC.

## (undated) DEVICE — KENDALL SCD EXPRESS SLEEVES, KNEE LENGTH, MEDIUM: Brand: KENDALL SCD